# Patient Record
Sex: MALE | Race: WHITE | Employment: FULL TIME | URBAN - METROPOLITAN AREA
[De-identification: names, ages, dates, MRNs, and addresses within clinical notes are randomized per-mention and may not be internally consistent; named-entity substitution may affect disease eponyms.]

---

## 2019-10-21 PROBLEM — E66.01 SEVERE OBESITY (HCC): Status: ACTIVE | Noted: 2019-10-21

## 2019-10-27 ENCOUNTER — HOSPITAL ENCOUNTER (OUTPATIENT)
Dept: LAB | Age: 49
Discharge: HOME | End: 2019-10-27
Payer: COMMERCIAL

## 2019-10-27 LAB
ALBUMIN SERPL-MCNC: 3.9 G/DL (ref 3.5–5)
ALBUMIN/GLOB SERPL: 1 {RATIO} (ref 1.2–3.5)
ALP SERPL-CCNC: 78 U/L (ref 50–136)
ALT SERPL-CCNC: 43 U/L (ref 12–65)
ANION GAP SERPL CALC-SCNC: 8 MMOL/L (ref 7–16)
AST SERPL-CCNC: 20 U/L (ref 15–37)
BILIRUB SERPL-MCNC: 0.4 MG/DL (ref 0.2–1.1)
BUN SERPL-MCNC: 11 MG/DL (ref 6–23)
CALCIUM SERPL-MCNC: 9.8 MG/DL (ref 8.3–10.4)
CHLORIDE SERPL-SCNC: 106 MMOL/L (ref 98–107)
CHOLEST SERPL-MCNC: 267 MG/DL
CO2 SERPL-SCNC: 29 MMOL/L (ref 21–32)
CREAT SERPL-MCNC: 0.82 MG/DL (ref 0.8–1.5)
ERYTHROCYTE [DISTWIDTH] IN BLOOD BY AUTOMATED COUNT: 12.6 % (ref 11.9–14.6)
GLOBULIN SER CALC-MCNC: 4.1 G/DL (ref 2.3–3.5)
GLUCOSE SERPL-MCNC: 95 MG/DL (ref 65–100)
HCT VFR BLD AUTO: 48.8 % (ref 41.1–50.3)
HDLC SERPL-MCNC: 52 MG/DL (ref 40–60)
HDLC SERPL: 5.1 {RATIO}
HGB BLD-MCNC: 16 G/DL (ref 13.6–17.2)
LDLC SERPL CALC-MCNC: 185.8 MG/DL
LIPID PROFILE,FLP: ABNORMAL
MCH RBC QN AUTO: 31.6 PG (ref 26.1–32.9)
MCHC RBC AUTO-ENTMCNC: 32.8 G/DL (ref 31.4–35)
MCV RBC AUTO: 96.4 FL (ref 79.6–97.8)
NRBC # BLD: 0 K/UL (ref 0–0.2)
PLATELET # BLD AUTO: 295 K/UL (ref 150–450)
PMV BLD AUTO: 9.7 FL (ref 9.4–12.3)
POTASSIUM SERPL-SCNC: 4.4 MMOL/L (ref 3.5–5.1)
PROT SERPL-MCNC: 8 G/DL (ref 6.3–8.2)
PSA SERPL-MCNC: 0.4 NG/ML
RBC # BLD AUTO: 5.06 M/UL (ref 4.23–5.6)
SODIUM SERPL-SCNC: 143 MMOL/L (ref 136–145)
TRIGL SERPL-MCNC: 146 MG/DL (ref 35–150)
VLDLC SERPL CALC-MCNC: 29.2 MG/DL (ref 6–23)
WBC # BLD AUTO: 8.3 K/UL (ref 4.3–11.1)

## 2019-10-27 PROCEDURE — 80061 LIPID PANEL: CPT

## 2019-10-27 PROCEDURE — 84153 ASSAY OF PSA TOTAL: CPT

## 2019-10-27 PROCEDURE — 36415 COLL VENOUS BLD VENIPUNCTURE: CPT

## 2019-10-27 PROCEDURE — 80053 COMPREHEN METABOLIC PANEL: CPT

## 2019-10-27 PROCEDURE — 85027 COMPLETE CBC AUTOMATED: CPT

## 2019-10-30 PROBLEM — E78.5 HYPERLIPIDEMIA: Status: ACTIVE | Noted: 2019-10-30

## 2019-12-17 ENCOUNTER — HOSPITAL ENCOUNTER (OUTPATIENT)
Dept: GENERAL RADIOLOGY | Age: 49
Discharge: HOME | End: 2019-12-17
Payer: COMMERCIAL

## 2019-12-17 DIAGNOSIS — G89.29 CHRONIC MIDLINE LOW BACK PAIN WITHOUT SCIATICA: ICD-10-CM

## 2019-12-17 DIAGNOSIS — M54.50 CHRONIC MIDLINE LOW BACK PAIN WITHOUT SCIATICA: ICD-10-CM

## 2019-12-17 PROCEDURE — 72110 X-RAY EXAM L-2 SPINE 4/>VWS: CPT

## 2021-03-29 ENCOUNTER — HOSPITAL ENCOUNTER (EMERGENCY)
Age: 51
Discharge: HOME | End: 2021-03-29
Attending: EMERGENCY MEDICINE
Payer: COMMERCIAL

## 2021-03-29 VITALS
OXYGEN SATURATION: 98 % | SYSTOLIC BLOOD PRESSURE: 135 MMHG | RESPIRATION RATE: 16 BRPM | DIASTOLIC BLOOD PRESSURE: 67 MMHG | HEART RATE: 71 BPM | BODY MASS INDEX: 34.99 KG/M2 | TEMPERATURE: 97.8 F | HEIGHT: 65 IN | WEIGHT: 210 LBS

## 2021-03-29 DIAGNOSIS — M54.50 ACUTE RIGHT-SIDED LOW BACK PAIN WITHOUT SCIATICA: Primary | ICD-10-CM

## 2021-03-29 PROCEDURE — 74011250637 HC RX REV CODE- 250/637: Performed by: PHYSICIAN ASSISTANT

## 2021-03-29 PROCEDURE — 99283 EMERGENCY DEPT VISIT LOW MDM: CPT

## 2021-03-29 RX ORDER — CYCLOBENZAPRINE HCL 10 MG
10 TABLET ORAL
Qty: 10 TAB | Refills: 0 | Status: SHIPPED | OUTPATIENT
Start: 2021-03-29 | End: 2021-03-29 | Stop reason: SDUPTHER

## 2021-03-29 RX ORDER — CYCLOBENZAPRINE HCL 10 MG
10 TABLET ORAL
Status: COMPLETED | OUTPATIENT
Start: 2021-03-29 | End: 2021-03-29

## 2021-03-29 RX ORDER — CYCLOBENZAPRINE HCL 10 MG
10 TABLET ORAL
Qty: 10 TAB | Refills: 0 | Status: SHIPPED | OUTPATIENT
Start: 2021-03-29 | End: 2021-04-08

## 2021-03-29 RX ADMIN — CYCLOBENZAPRINE HYDROCHLORIDE 10 MG: 10 TABLET, FILM COATED ORAL at 10:08

## 2021-03-29 NOTE — Clinical Note
[address] [address] EMERGENCY DEPT 
[address] [facility] [id number] [phone] Work/School Note Date: 3/29/2021 To Whom It May concern: Roby Kline was seen and treated today in the emergency room by the following provider(s): 
Attending Provider: Laura St MD 
Physician Assistant: Jarred Yao. Roby Kline is excused from work/school on 03/29/21 and 03/30/21. He is medically clear to return to work/school on 3/31/2021.   
 
 
Sincerely, 
 
 
 
 
AKBAR Mcintosh

## 2021-03-29 NOTE — ED NOTES
I have reviewed discharge instructions with the patient and caregiver. The patient and caregiver verbalized understanding. Patient left ED via Discharge Method: ambulatory to Home with daughter. Opportunity for questions and clarification provided. Patient given 1 scripts. Flexeril. Work excuse given. Stretch. To continue your aftercare when you leave the hospital, you may receive an automated call from our care team to check in on how you are doing. This is a free service and part of our promise to provide the best care and service to meet your aftercare needs.  If you have questions, or wish to unsubscribe from this service please call [phone]. Thank you for Choosing our [facility] Emergency Department.

## 2021-03-29 NOTE — ED PROVIDER NOTES
Patient is a 67-year-old male who presents to the emergency room with chief complaint of right-sided lumbar back pain that started Friday. States he was sweeping when pain began. Pain progressively worsened over the weekend. Patient denies any specific trauma or falls. Was using Tylenol over the weekend with some minimal improvement of pain. Denies bladder/bowel incontinence, saddle anesthesia, fever, unintentional weight, loss night sweats. Past Medical History:   Diagnosis Date    Obesity (BMI 30-39. 9)     BMI 34.2    Psychiatric disorder     depression- no meds       Past Surgical History:   Procedure Laterality Date    HX ORTHOPAEDIC Right 2013    Knee         Family History:   Problem Relation Age of Onset    Arthritis-osteo Mother     Other Mother         Hypothyroidism    Prostate Cancer Father        Social History     Socioeconomic History    Marital status: SINGLE     Spouse name: Not on file    Number of children: Not on file    Years of education: Not on file    Highest education level: Not on file   Occupational History    Not on file   Social Needs    Financial resource strain: Not on file    Food insecurity     Worry: Not on file     Inability: Not on file    Transportation needs     Medical: Not on file     Non-medical: Not on file   Tobacco Use    Smoking status: Never Smoker    Smokeless tobacco: Never Used   Substance and Sexual Activity    Alcohol use: Yes     Comment: ocassional alcohol use    Drug use: No    Sexual activity: Not on file   Lifestyle    Physical activity     Days per week: Not on file     Minutes per session: Not on file    Stress: Not on file   Relationships    Social connections     Talks on phone: Not on file     Gets together: Not on file     Attends Roman Catholic service: Not on file     Active member of club or organization: Not on file     Attends meetings of clubs or organizations: Not on file     Relationship status: Not on file   Hyun Dillon Intimate partner violence     Fear of current or ex partner: Not on file     Emotionally abused: Not on file     Physically abused: Not on file     Forced sexual activity: Not on file   Other Topics Concern    Not on file   Social History Narrative    Not on file         ALLERGIES: Patient has no known allergies. Review of Systems   Constitutional: Negative for chills and fever. HENT: Negative for facial swelling. Respiratory: Negative for chest tightness and shortness of breath. Cardiovascular: Negative for chest pain. Gastrointestinal: Negative for abdominal pain, nausea and vomiting. Musculoskeletal: Positive for back pain. Negative for arthralgias, gait problem, joint swelling, myalgias, neck pain and neck stiffness. Neurological: Negative for headaches. Psychiatric/Behavioral: Negative for confusion. All other systems reviewed and are negative. Vitals:    03/29/21 0930   BP: 135/67   Pulse: 71   Resp: 16   Temp: 97.8 °F (36.6 °C)   SpO2: 98%   Weight: 95.3 kg (210 lb)   Height: 5' 5\" (1.651 m)            Physical Exam  Vitals signs and nursing note reviewed. Constitutional:       Appearance: Normal appearance. HENT:      Head: Normocephalic and atraumatic. Mouth/Throat:      Mouth: Mucous membranes are moist.   Eyes:      Pupils: Pupils are equal, round, and reactive to light. Cardiovascular:      Rate and Rhythm: Normal rate and regular rhythm. Pulmonary:      Effort: No respiratory distress. Breath sounds: Normal breath sounds. Abdominal:      Palpations: Abdomen is soft. Tenderness: There is no abdominal tenderness. There is no guarding. Musculoskeletal:      Lumbar back: He exhibits spasm. Comments: Right sided paraspinal tenderness, lumbar muscle spasm. Skin:     General: Skin is warm and dry. Neurological:      Mental Status: He is alert and oriented to person, place, and time. Mental status is at baseline.    Psychiatric:         Mood and Affect: Mood normal.          MDM  Number of Diagnoses or Management Options  Acute right-sided low back pain without sciatica  Diagnosis management comments: Patient is a 43-year-old male who presented to emergency room with chief complaint of nontraumatic back pain. Pain began on Friday, progressively worsened as the weekend progressed. Patient denies any trauma or saddle anesthesia or bladder/bowel incontinence. On exam he has a tender lumbar paraspinal muscle spasm. Will treat with Flexeril in the ED, instructions for no lifting bending or twisting, 2 days off of work. Also sent home with an Rx for Flexeril. Patient verbalizes understanding of instructions and Aftercare. He understands importance of following up with primary care, understands indications for which to return to the emergency room such as development of bladder/bowel incontinence, saddle anesthesia, difficulty ambulating, numbness or weakness of lower extremities. Time of discharge patient is alert awake, in no acute distress, afebrile.     Risk of Complications, Morbidity, and/or Mortality  Presenting problems: low  Diagnostic procedures: low  Management options: low    Patient Progress  Patient progress: improved         Procedures

## 2021-03-29 NOTE — LETTER
[address] [address] EMERGENCY DEPT 
[address] [facility] 
[place]et[place] [id number] [phone] Work/School Note Date: 3/29/2021 To Whom It May concern: 
 
 
 
Pastora Hannon needs to be excused from class today on 3/29/21. She was in the ED with her father.  
 
Sincerely, 
 
 
 
 
Helena Angulo RN

## 2021-03-29 NOTE — ED TRIAGE NOTES
Pt presents to ED c/o lower back pain that started a few days ago after bending over and standing back up at work. Denies current numbness/tingling to extremities. No loss of bowel or bladder. Able to ambulate but slowly. Took Tylenol this morning.

## 2021-08-19 PROBLEM — Z00.00 PHYSICAL EXAM: Status: ACTIVE | Noted: 2021-08-19

## 2021-08-19 PROBLEM — Z13.228 SCREENING FOR METABOLIC DISORDER: Status: ACTIVE | Noted: 2021-08-19

## 2021-08-19 PROBLEM — Z23 ENCOUNTER FOR IMMUNIZATION: Status: ACTIVE | Noted: 2021-08-19

## 2021-08-19 PROBLEM — Z12.5 SCREENING FOR PROSTATE CANCER: Status: ACTIVE | Noted: 2021-08-19

## 2021-08-19 PROBLEM — Z12.11 SCREENING FOR COLON CANCER: Status: ACTIVE | Noted: 2021-08-19

## 2021-08-19 PROBLEM — Z11.59 NEED FOR HEPATITIS C SCREENING TEST: Status: ACTIVE | Noted: 2021-08-19

## 2021-08-25 ENCOUNTER — APPOINTMENT (OUTPATIENT)
Dept: GENERAL RADIOLOGY | Age: 51
End: 2021-08-25
Attending: EMERGENCY MEDICINE
Payer: COMMERCIAL

## 2021-08-25 ENCOUNTER — HOSPITAL ENCOUNTER (EMERGENCY)
Age: 51
Discharge: HOME | End: 2021-08-25
Attending: EMERGENCY MEDICINE
Payer: COMMERCIAL

## 2021-08-25 VITALS
BODY MASS INDEX: 35.82 KG/M2 | WEIGHT: 215 LBS | TEMPERATURE: 98.1 F | HEART RATE: 68 BPM | SYSTOLIC BLOOD PRESSURE: 127 MMHG | RESPIRATION RATE: 13 BRPM | HEIGHT: 65 IN | DIASTOLIC BLOOD PRESSURE: 68 MMHG | OXYGEN SATURATION: 98 %

## 2021-08-25 DIAGNOSIS — R07.81 PLEURITIC CHEST PAIN: Primary | ICD-10-CM

## 2021-08-25 LAB
ALBUMIN SERPL-MCNC: 3.5 G/DL (ref 3.5–5)
ALBUMIN/GLOB SERPL: 0.9 {RATIO} (ref 1.2–3.5)
ALP SERPL-CCNC: 86 U/L (ref 50–136)
ALT SERPL-CCNC: 39 U/L (ref 12–65)
ANION GAP SERPL CALC-SCNC: 5 MMOL/L (ref 7–16)
AST SERPL-CCNC: 16 U/L (ref 15–37)
ATRIAL RATE: 75 BPM
BASOPHILS # BLD: 0.1 K/UL (ref 0–0.2)
BASOPHILS NFR BLD: 1 % (ref 0–2)
BILIRUB SERPL-MCNC: 0.3 MG/DL (ref 0.2–1.1)
BUN SERPL-MCNC: 17 MG/DL (ref 6–23)
CALCIUM SERPL-MCNC: 9 MG/DL (ref 8.3–10.4)
CALCULATED P AXIS, ECG09: 52 DEGREES
CALCULATED R AXIS, ECG10: 71 DEGREES
CALCULATED T AXIS, ECG11: 75 DEGREES
CHLORIDE SERPL-SCNC: 110 MMOL/L (ref 98–107)
CO2 SERPL-SCNC: 26 MMOL/L (ref 21–32)
CREAT SERPL-MCNC: 0.87 MG/DL (ref 0.8–1.5)
DIAGNOSIS, 93000: NORMAL
DIFFERENTIAL METHOD BLD: NORMAL
EOSINOPHIL # BLD: 0.4 K/UL (ref 0–0.8)
EOSINOPHIL NFR BLD: 4 % (ref 0.5–7.8)
ERYTHROCYTE [DISTWIDTH] IN BLOOD BY AUTOMATED COUNT: 12.8 % (ref 11.9–14.6)
GLOBULIN SER CALC-MCNC: 3.9 G/DL (ref 2.3–3.5)
GLUCOSE SERPL-MCNC: 108 MG/DL (ref 65–100)
HCT VFR BLD AUTO: 47.3 % (ref 41.1–50.3)
HGB BLD-MCNC: 15.8 G/DL (ref 13.6–17.2)
IMM GRANULOCYTES # BLD AUTO: 0.1 K/UL (ref 0–0.5)
IMM GRANULOCYTES NFR BLD AUTO: 1 % (ref 0–5)
LIPASE SERPL-CCNC: 277 U/L (ref 73–393)
LYMPHOCYTES # BLD: 2.6 K/UL (ref 0.5–4.6)
LYMPHOCYTES NFR BLD: 26 % (ref 13–44)
MAGNESIUM SERPL-MCNC: 1.9 MG/DL (ref 1.8–2.4)
MCH RBC QN AUTO: 32 PG (ref 26.1–32.9)
MCHC RBC AUTO-ENTMCNC: 33.4 G/DL (ref 31.4–35)
MCV RBC AUTO: 95.7 FL (ref 79.6–97.8)
MONOCYTES # BLD: 0.8 K/UL (ref 0.1–1.3)
MONOCYTES NFR BLD: 9 % (ref 4–12)
NEUTS SEG # BLD: 5.8 K/UL (ref 1.7–8.2)
NEUTS SEG NFR BLD: 59 % (ref 43–78)
NRBC # BLD: 0 K/UL (ref 0–0.2)
P-R INTERVAL, ECG05: 146 MS
PLATELET # BLD AUTO: 275 K/UL (ref 150–450)
PMV BLD AUTO: 9.6 FL (ref 9.4–12.3)
POTASSIUM SERPL-SCNC: 3.7 MMOL/L (ref 3.5–5.1)
PROT SERPL-MCNC: 7.4 G/DL (ref 6.3–8.2)
Q-T INTERVAL, ECG07: 370 MS
QRS DURATION, ECG06: 82 MS
QTC CALCULATION (BEZET), ECG08: 413 MS
RBC # BLD AUTO: 4.94 M/UL (ref 4.23–5.6)
SODIUM SERPL-SCNC: 141 MMOL/L (ref 136–145)
TROPONIN-HIGH SENSITIVITY: 9 PG/ML (ref 0–14)
TROPONIN-HIGH SENSITIVITY: 9.4 PG/ML (ref 0–14)
VENTRICULAR RATE, ECG03: 75 BPM
WBC # BLD AUTO: 9.8 K/UL (ref 4.3–11.1)

## 2021-08-25 PROCEDURE — 84484 ASSAY OF TROPONIN QUANT: CPT

## 2021-08-25 PROCEDURE — 85025 COMPLETE CBC W/AUTO DIFF WBC: CPT

## 2021-08-25 PROCEDURE — 83735 ASSAY OF MAGNESIUM: CPT

## 2021-08-25 PROCEDURE — 80053 COMPREHEN METABOLIC PANEL: CPT

## 2021-08-25 PROCEDURE — 71045 X-RAY EXAM CHEST 1 VIEW: CPT

## 2021-08-25 PROCEDURE — 74011250637 HC RX REV CODE- 250/637: Performed by: EMERGENCY MEDICINE

## 2021-08-25 PROCEDURE — 74011250636 HC RX REV CODE- 250/636: Performed by: EMERGENCY MEDICINE

## 2021-08-25 PROCEDURE — 83690 ASSAY OF LIPASE: CPT

## 2021-08-25 PROCEDURE — 96374 THER/PROPH/DIAG INJ IV PUSH: CPT

## 2021-08-25 PROCEDURE — 99284 EMERGENCY DEPT VISIT MOD MDM: CPT

## 2021-08-25 PROCEDURE — 93005 ELECTROCARDIOGRAM TRACING: CPT | Performed by: EMERGENCY MEDICINE

## 2021-08-25 RX ORDER — NAPROXEN 500 MG/1
500 TABLET ORAL 2 TIMES DAILY WITH MEALS
Qty: 20 TABLET | Refills: 0 | Status: SHIPPED | OUTPATIENT
Start: 2021-08-25

## 2021-08-25 RX ORDER — GUAIFENESIN 100 MG/5ML
324 LIQUID (ML) ORAL
Status: COMPLETED | OUTPATIENT
Start: 2021-08-25 | End: 2021-08-25

## 2021-08-25 RX ORDER — SODIUM CHLORIDE 0.9 % (FLUSH) 0.9 %
5-40 SYRINGE (ML) INJECTION AS NEEDED
Status: DISCONTINUED | OUTPATIENT
Start: 2021-08-25 | End: 2021-08-25 | Stop reason: HOSPADM

## 2021-08-25 RX ORDER — SODIUM CHLORIDE 0.9 % (FLUSH) 0.9 %
5-10 SYRINGE (ML) INJECTION EVERY 8 HOURS
Status: DISCONTINUED | OUTPATIENT
Start: 2021-08-25 | End: 2021-08-25 | Stop reason: HOSPADM

## 2021-08-25 RX ORDER — SODIUM CHLORIDE 0.9 % (FLUSH) 0.9 %
5-40 SYRINGE (ML) INJECTION EVERY 8 HOURS
Status: DISCONTINUED | OUTPATIENT
Start: 2021-08-25 | End: 2021-08-25 | Stop reason: HOSPADM

## 2021-08-25 RX ORDER — SODIUM CHLORIDE 0.9 % (FLUSH) 0.9 %
5-10 SYRINGE (ML) INJECTION AS NEEDED
Status: DISCONTINUED | OUTPATIENT
Start: 2021-08-25 | End: 2021-08-25 | Stop reason: HOSPADM

## 2021-08-25 RX ORDER — KETOROLAC TROMETHAMINE 30 MG/ML
15 INJECTION, SOLUTION INTRAMUSCULAR; INTRAVENOUS ONCE
Status: COMPLETED | OUTPATIENT
Start: 2021-08-25 | End: 2021-08-25

## 2021-08-25 RX ADMIN — KETOROLAC TROMETHAMINE 15 MG: 30 INJECTION, SOLUTION INTRAMUSCULAR at 03:45

## 2021-08-25 RX ADMIN — ASPIRIN 324 MG: 81 TABLET, CHEWABLE ORAL at 03:23

## 2021-08-25 NOTE — ED PROVIDER NOTES
The history is provided by the patient and a relative. Chest Pain   This is a new problem. The current episode started 1 to 2 hours ago. The problem has not changed since onset. The problem occurs rarely. The pain is associated with movement. The pain is present in the left side. The pain is moderate. The quality of the pain is described as stabbing and sharp. The pain does not radiate. The symptoms are aggravated by movement. Pertinent negatives include no abdominal pain, no back pain, no cough, no diaphoresis, no dizziness, no exertional chest pressure, no fever, no headaches, no hemoptysis, no irregular heartbeat, no leg pain, no lower extremity edema, no malaise/fatigue, no nausea, no near-syncope, no numbness, no palpitations, no shortness of breath and no vomiting. He has tried nothing for the symptoms. Risk factors include male gender, dyslipidemia and obesity. His past medical history does not include DM or HTN. Pertinent negatives include no cardiac catheterization. Past Medical History:   Diagnosis Date    Obesity (BMI 30-39. 9)     BMI 34.2    Psychiatric disorder     depression- no meds       Past Surgical History:   Procedure Laterality Date    HX ORTHOPAEDIC Right 2013    Knee         Family History:   Problem Relation Age of Onset    Arthritis-osteo Mother     Other Mother         Hypothyroidism    Prostate Cancer Father        Social History     Socioeconomic History    Marital status: SINGLE     Spouse name: Not on file    Number of children: Not on file    Years of education: Not on file    Highest education level: Not on file   Occupational History    Not on file   Tobacco Use    Smoking status: Never Smoker    Smokeless tobacco: Never Used   Substance and Sexual Activity    Alcohol use: Yes     Comment: ocassional alcohol use    Drug use: No    Sexual activity: Not on file   Other Topics Concern    Not on file   Social History Narrative    Not on file     Social Determinants of Health     Financial Resource Strain: Low Risk     Difficulty of Paying Living Expenses: Not very hard   Food Insecurity: Unknown    Worried About Running Out of Food in the Last Year: Never true    Vasiliy of Food in the Last Year: Not on file   Transportation Needs: No Transportation Needs    Lack of Transportation (Medical): No    Lack of Transportation (Non-Medical): No   Physical Activity:     Days of Exercise per Week:     Minutes of Exercise per Session:    Stress:     Feeling of Stress :    Social Connections:     Frequency of Communication with Friends and Family:     Frequency of Social Gatherings with Friends and Family:     Attends Yazidism Services:     Active Member of Clubs or Organizations:     Attends Club or Organization Meetings:     Marital Status:    Intimate Partner Violence:     Fear of Current or Ex-Partner:     Emotionally Abused:     Physically Abused:     Sexually Abused: ALLERGIES: Patient has no known allergies. Review of Systems   Constitutional: Negative for activity change, chills, diaphoresis, fever and malaise/fatigue. HENT: Negative for dental problem, hearing loss, nosebleeds, rhinorrhea and sore throat. Eyes: Negative for pain, discharge, redness and visual disturbance. Respiratory: Negative for cough, hemoptysis, chest tightness and shortness of breath. Cardiovascular: Positive for chest pain. Negative for palpitations, leg swelling and near-syncope. Gastrointestinal: Negative for abdominal pain, constipation, diarrhea, nausea and vomiting. Endocrine: Negative for cold intolerance, heat intolerance, polydipsia and polyuria. Genitourinary: Negative for dysuria and flank pain. Musculoskeletal: Negative for arthralgias, back pain, joint swelling, myalgias and neck pain. Skin: Negative for pallor and rash. Allergic/Immunologic: Negative for environmental allergies and food allergies.    Neurological: Negative for dizziness, tremors, light-headedness, numbness and headaches. Hematological: Negative for adenopathy. Does not bruise/bleed easily. Psychiatric/Behavioral: Negative for confusion and dysphoric mood. The patient is not nervous/anxious and is not hyperactive. All other systems reviewed and are negative. Vitals:    08/25/21 0253   BP: 132/71   Pulse: 72   Resp: 18   SpO2: 98%   Weight: 97.5 kg (215 lb)   Height: 5' 5\" (1.651 m)            Physical Exam  Vitals and nursing note reviewed. Constitutional:       General: He is in acute distress. Appearance: He is well-developed. He is obese. HENT:      Head: Normocephalic and atraumatic. Right Ear: External ear normal.      Left Ear: External ear normal.      Mouth/Throat:      Pharynx: No oropharyngeal exudate. Eyes:      General: No scleral icterus. Conjunctiva/sclera: Conjunctivae normal.      Pupils: Pupils are equal, round, and reactive to light. Neck:      Thyroid: No thyromegaly. Vascular: No JVD. Cardiovascular:      Rate and Rhythm: Normal rate and regular rhythm. Heart sounds: Normal heart sounds. No murmur heard. No friction rub. No gallop. Pulmonary:      Effort: Pulmonary effort is normal. No respiratory distress. Breath sounds: Normal breath sounds. No decreased breath sounds or wheezing. Chest:      Chest wall: Tenderness present. Breasts: Breasts are symmetrical.         Right: No inverted nipple. Left: No inverted nipple. Abdominal:      General: Bowel sounds are normal. There is no distension. Palpations: Abdomen is soft. Tenderness: There is no abdominal tenderness. Musculoskeletal:         General: No tenderness or deformity. Normal range of motion. Cervical back: Normal range of motion and neck supple. Skin:     General: Skin is warm and dry. Capillary Refill: Capillary refill takes less than 2 seconds. Findings: No rash.    Neurological: General: No focal deficit present. Mental Status: He is alert and oriented to person, place, and time. Cranial Nerves: No cranial nerve deficit. Sensory: No sensory deficit. Motor: No abnormal muscle tone. Coordination: Coordination normal.   Psychiatric:         Mood and Affect: Mood normal. Mood is not anxious. Behavior: Behavior normal. Behavior is not agitated. Thought Content: Thought content normal.         Judgment: Judgment normal.          MDM  Number of Diagnoses or Management Options  Pleuritic chest pain: new and requires workup  Diagnosis management comments: 40-year-old male presents with left-sided chest discomfort onset about 2 hours prior to arrival  Left-sided shooting pains  Worse with change in position and other movements  No prior episodes    Initial EKG is unremarkable    3:35 AM  Initial troponin IX  Chest x-ray unremarkable    We will continue to monitor, Toradol for pain  Repeat troponin pending    5:47 AM  Repeat troponin remains negative    Patient will be discharged to follow-up with his primary care doctor    Start NSAID       Amount and/or Complexity of Data Reviewed  Clinical lab tests: ordered and reviewed  Tests in the radiology section of CPT®: ordered and reviewed  Tests in the medicine section of CPT®: ordered and reviewed  Decide to obtain previous medical records or to obtain history from someone other than the patient: yes  Obtain history from someone other than the patient: yes  Review and summarize past medical records: yes  Independent visualization of images, tracings, or specimens: yes    Risk of Complications, Morbidity, and/or Mortality  Presenting problems: moderate  Diagnostic procedures: moderate  Management options: moderate  General comments: Elements of this note have been dictated via voice recognition software. Text and phrases may be limited by the accuracy of the software.   The chart has been reviewed, but errors may still be present.       Patient Progress  Patient progress: stable         EKG    Date/Time: 8/25/2021 2:54 AM  Performed by: Yordy Su MD  Authorized by: Yordy Su MD     ECG reviewed by ED Physician in the absence of a cardiologist: yes    Previous ECG:     Previous ECG:  Unavailable  Interpretation:     Interpretation: normal    Rate:     ECG rate assessment: normal    Rhythm:     Rhythm: sinus rhythm    Ectopy:     Ectopy: none    QRS:     QRS axis:  Normal    QRS intervals:  Normal  Conduction:     Conduction: normal    ST segments:     ST segments:  Normal  T waves:     T waves: normal    Comments:      No acute ischemic changes  No prior tracings available for comparison

## 2021-08-25 NOTE — LETTER
[facility] EMERGENCY DEPT  [address] [address] [phone]    Work/School Note    Date: 8/25/2021    To Whom It May concern:    Grey Milton was seen and treated today in the emergency room by the following provider(s):  No providers found. Grey Milton may return to work on 8/26/21.     Sincerely,

## 2021-08-25 NOTE — ED NOTES
I have reviewed discharge instructions with the patient. The patient verbalized understanding. Patient left ED via Discharge Method: ambulatory to Home with family. Opportunity for questions and clarification provided. Patient given 1 scripts. To continue your aftercare when you leave the hospital, you may receive an automated call from our care team to check in on how you are doing. This is a free service and part of our promise to provide the best care and service to meet your aftercare needs.  If you have questions, or wish to unsubscribe from this service please call [phone]. Thank you for Choosing our [facility] Emergency Department.

## 2021-08-25 NOTE — DISCHARGE INSTRUCTIONS
Take medication as prescribed  No heavy lifting  Drink plenty of fluids  Follow-up with your doctor for recheck  If your symptoms change or worsen in any way please return immediately to the ER for reevaluation

## 2021-09-18 PROBLEM — Z12.5 SCREENING FOR PROSTATE CANCER: Status: RESOLVED | Noted: 2021-08-19 | Resolved: 2021-09-18

## 2021-09-18 PROBLEM — Z23 ENCOUNTER FOR IMMUNIZATION: Status: RESOLVED | Noted: 2021-08-19 | Resolved: 2021-09-18

## 2021-09-18 PROBLEM — Z00.00 PHYSICAL EXAM: Status: RESOLVED | Noted: 2021-08-19 | Resolved: 2021-09-18

## 2021-09-18 PROBLEM — Z13.228 SCREENING FOR METABOLIC DISORDER: Status: RESOLVED | Noted: 2021-08-19 | Resolved: 2021-09-18

## 2021-09-18 PROBLEM — Z12.11 SCREENING FOR COLON CANCER: Status: RESOLVED | Noted: 2021-08-19 | Resolved: 2021-09-18

## 2021-12-08 ENCOUNTER — HOSPITAL ENCOUNTER (OUTPATIENT)
Dept: LAB | Age: 51
Discharge: HOME | End: 2021-12-08

## 2021-12-08 PROCEDURE — 88305 TISSUE EXAM BY PATHOLOGIST: CPT

## 2021-12-10 NOTE — ED NOTES
Pt. Woke with left sided cp, states no pain now pain when he lays down Quality 110: Preventive Care And Screening: Influenza Immunization: Influenza immunization was not ordered or administered, reason not given Quality 226: Preventive Care And Screening: Tobacco Use: Screening And Cessation Intervention: Patient screened for tobacco use and is an ex/non-smoker Detail Level: Detailed Quality 130: Documentation Of Current Medications In The Medical Record: Current Medications Documented Quality 431: Preventive Care And Screening: Unhealthy Alcohol Use - Screening: Patient not identified as an unhealthy alcohol user when screened for unhealthy alcohol use using a systematic screening method

## 2021-12-19 ENCOUNTER — HOSPITAL ENCOUNTER (EMERGENCY)
Age: 51
Discharge: HOME | End: 2021-12-19
Attending: EMERGENCY MEDICINE
Payer: COMMERCIAL

## 2021-12-19 VITALS
BODY MASS INDEX: 36.32 KG/M2 | WEIGHT: 218 LBS | OXYGEN SATURATION: 95 % | HEART RATE: 79 BPM | RESPIRATION RATE: 18 BRPM | HEIGHT: 65 IN | SYSTOLIC BLOOD PRESSURE: 132 MMHG | TEMPERATURE: 97.7 F | DIASTOLIC BLOOD PRESSURE: 70 MMHG

## 2021-12-19 DIAGNOSIS — L50.9 URTICARIA: Primary | ICD-10-CM

## 2021-12-19 PROCEDURE — 96374 THER/PROPH/DIAG INJ IV PUSH: CPT

## 2021-12-19 PROCEDURE — 74011250636 HC RX REV CODE- 250/636

## 2021-12-19 PROCEDURE — 74011250637 HC RX REV CODE- 250/637

## 2021-12-19 PROCEDURE — 96375 TX/PRO/DX INJ NEW DRUG ADDON: CPT

## 2021-12-19 PROCEDURE — 99283 EMERGENCY DEPT VISIT LOW MDM: CPT

## 2021-12-19 RX ORDER — SODIUM CHLORIDE 0.9 % (FLUSH) 0.9 %
5-40 SYRINGE (ML) INJECTION AS NEEDED
Status: DISCONTINUED | OUTPATIENT
Start: 2021-12-19 | End: 2021-12-20 | Stop reason: HOSPADM

## 2021-12-19 RX ORDER — FAMOTIDINE 20 MG/1
20 TABLET, FILM COATED ORAL
Status: COMPLETED | OUTPATIENT
Start: 2021-12-19 | End: 2021-12-19

## 2021-12-19 RX ORDER — DIPHENHYDRAMINE HYDROCHLORIDE 50 MG/ML
12.5 INJECTION, SOLUTION INTRAMUSCULAR; INTRAVENOUS
Status: COMPLETED | OUTPATIENT
Start: 2021-12-19 | End: 2021-12-19

## 2021-12-19 RX ORDER — SODIUM CHLORIDE 0.9 % (FLUSH) 0.9 %
5-40 SYRINGE (ML) INJECTION EVERY 8 HOURS
Status: DISCONTINUED | OUTPATIENT
Start: 2021-12-19 | End: 2021-12-20 | Stop reason: HOSPADM

## 2021-12-19 RX ADMIN — DIPHENHYDRAMINE HYDROCHLORIDE 12.5 MG: 50 INJECTION INTRAMUSCULAR; INTRAVENOUS at 19:16

## 2021-12-19 RX ADMIN — FAMOTIDINE 20 MG: 20 TABLET, FILM COATED ORAL at 18:56

## 2021-12-19 RX ADMIN — METHYLPREDNISOLONE SODIUM SUCCINATE 40 MG: 125 INJECTION, POWDER, FOR SOLUTION INTRAMUSCULAR; INTRAVENOUS at 19:15

## 2021-12-19 NOTE — ED TRIAGE NOTES
Pt states he woke up with a rash this morning and it is now hives with itching all over body. Denies any new foods, soap, detergents. No known allergy. Masked for triage.

## 2021-12-19 NOTE — ED NOTES
PT states woke up this morning covered in hives. No known allergies. No shortness of breath, no difficulty swallowing. No new foods, detergents, soaps. Pt has not taken any medications since hives started. PT states he had a colonoscopy two weeks ago and was told not to take any medications. NAD. Breath sounds normal, no pharyngeal edema. Patient received a rapid medical exam by a provider Francesca Rock PA-C at 1349.

## 2021-12-20 NOTE — ED NOTES
I have reviewed discharge instructions with the patient. The patient verbalized understanding. Patient left ED via Discharge Method: ambulatory to Home with daughter. Opportunity for questions and clarification provided. Patient given 0 scripts. To continue your aftercare when you leave the hospital, you may receive an automated call from our care team to check in on how you are doing. This is a free service and part of our promise to provide the best care and service to meet your aftercare needs.  If you have questions, or wish to unsubscribe from this service please call [phone]. Thank you for Choosing our [facility] Emergency Department.

## 2021-12-20 NOTE — DISCHARGE INSTRUCTIONS
I suspect that you had an allergic reaction to the carpet glue in your brand-new home. Do not sleep on the carpet again tonight. If you wake up in the morning and the itching and rash has returned, you can take 2 over-the-counter Benadryl. No symptomatic relief, contact your primary care provider. Please return to the emergency department if you have difficulty breathing, swelling of your throat, tongue, face, worsening of the symptoms that you had today.

## 2021-12-20 NOTE — ED PROVIDER NOTES
Mr. Maude Gray is a 48-year-old man with history of hyperlipidemia and no known allergies presents to the emergency department with chief complaint of \"hives all over his body that are itchy. \"  Patient states he just moved into a brand-new home was just built neck last night he slept on the carpet with his dog. Patient denies shortness of breath, swelling of throat, chest pain, chest tightness, nausea, vomiting, diarrhea, or pain. Patient endorses diffuse itchiness. Patient denies any new detergents, soap, clothes, or change in environment other than his new home. The history is provided by the patient. No  was used. Past Medical History:   Diagnosis Date    Obesity (BMI 30-39. 9)     BMI 34.2    Psychiatric disorder     depression- no meds       Past Surgical History:   Procedure Laterality Date    HX ORTHOPAEDIC Right 2013    Knee         Family History:   Problem Relation Age of Onset    OSTEOARTHRITIS Mother     Other Mother         Hypothyroidism    Prostate Cancer Father        Social History     Socioeconomic History    Marital status: SINGLE     Spouse name: Not on file    Number of children: Not on file    Years of education: Not on file    Highest education level: Not on file   Occupational History    Not on file   Tobacco Use    Smoking status: Never Smoker    Smokeless tobacco: Never Used   Substance and Sexual Activity    Alcohol use: Yes     Comment: ocassional alcohol use    Drug use: No    Sexual activity: Not on file   Other Topics Concern    Not on file   Social History Narrative    Not on file     Social Determinants of Health     Financial Resource Strain: Low Risk     Difficulty of Paying Living Expenses: Not very hard   Food Insecurity: Unknown    Worried About Running Out of Food in the Last Year: Never true    Vasiliy of Food in the Last Year: Not on file   Transportation Needs: No Transportation Needs    Lack of Transportation (Medical):  No  Lack of Transportation (Non-Medical): No   Physical Activity:     Days of Exercise per Week: Not on file    Minutes of Exercise per Session: Not on file   Stress:     Feeling of Stress : Not on file   Social Connections:     Frequency of Communication with Friends and Family: Not on file    Frequency of Social Gatherings with Friends and Family: Not on file    Attends [employer] Services: Not on file    Active Member of [address] [place] [address] or Organizations: Not on file    Attends Club or Organization Meetings: Not on file    Marital Status: Not on file   Intimate Partner Violence:     Fear of Current or Ex-Partner: Not on file    Emotionally Abused: Not on file    Physically Abused: Not on file    Sexually Abused: Not on file   Housing Stability:     Unable to Pay for Housing in the Last Year: Not on file    Number of [place] in the Last Year: Not on file    Unstable Housing in the Last Year: Not on file         ALLERGIES: Patient has no known allergies. Review of Systems   Constitutional: Negative for chills, diaphoresis and fatigue. HENT: Negative for drooling, facial swelling and sore throat. Eyes: Negative for redness. Respiratory: Negative for cough, shortness of breath, wheezing and stridor. Cardiovascular: Negative for chest pain and palpitations. Gastrointestinal: Negative for abdominal pain and nausea. Skin: Positive for rash. Neurological: Negative for light-headedness. All other systems reviewed and are negative. Vitals:    12/19/21 1847   BP: 127/77   Pulse: 85   Resp: 20   Temp: 97.6 °F (36.4 °C)   SpO2: 96%   Weight: 98.9 kg (218 lb)   Height: 5' 5\" (1.651 m)            Physical Exam  Vitals and nursing note reviewed. Constitutional:       General: He is not in acute distress. Appearance: Normal appearance. He is not ill-appearing, toxic-appearing or diaphoretic. HENT:      Head: Normocephalic and atraumatic. Jaw: No trismus or swelling.       Salivary Glands: Right salivary gland is not diffusely enlarged or tender. Left salivary gland is not diffusely enlarged or tender. Mouth/Throat:      Lips: Pink. Mouth: Mucous membranes are moist. No oral lesions or angioedema. Dentition: No dental abscesses. Tongue: No lesions. Tongue does not deviate from midline. Palate: No mass and lesions. Pharynx: Oropharynx is clear. Uvula midline. No pharyngeal swelling, oropharyngeal exudate, posterior oropharyngeal erythema or uvula swelling. Tonsils: No tonsillar exudate or tonsillar abscesses. 0 on the right. 0 on the left. Eyes:      General: No scleral icterus. Extraocular Movements: Extraocular movements intact. Conjunctiva/sclera: Conjunctivae normal.   Neck:      Comments: No edema of the neck  Cardiovascular:      Rate and Rhythm: Normal rate. Pulses: Normal pulses. Heart sounds: Normal heart sounds. Pulmonary:      Effort: Pulmonary effort is normal.      Breath sounds: Normal breath sounds. Abdominal:      General: Bowel sounds are normal.      Palpations: Abdomen is soft. Musculoskeletal:         General: No swelling or tenderness. Cervical back: No tenderness. Skin:     General: Skin is warm and dry. Findings: Rash (Diffuse [place] area covering entire body from neck down.) present. Rash is purpuric and urticarial (Diffuse, sparing only face, palms of hands and soles of feet). Rash is not crusting, macular, nodular, papular, pustular, scaling or vesicular. Neurological:      Mental Status: He is alert and oriented to person, place, and time. Sensory: No sensory deficit. Psychiatric:         Mood and Affect: Mood normal.         Behavior: Behavior normal.         Thought Content:  Thought content normal.         Judgment: Judgment normal.          MDM  Number of Diagnoses or Management Options  Urticaria  Diagnosis management comments: 58-year-old male with onset of urticaria starting this morning. Diffuse rash all over body with the exception of face, palms of hands, soles of feet. Vital signs normal, patient stable. No signs of respiratory distress. No angioedema of the tongue. No pharyngeal or oral edema. Patient is handling secretions. IV started. Patient will be administered Benadryl, Solu-Medrol, Pepcid IV. Will monitor patient in the ER. Patient recently moved into a new house that was just built and spent last night sleeping on brand-new carpet with his dog. Likely cause of urticaria adhesive in the new carpet. Do not feel that any labs are warranted at this time. No imaging necessary at this time. Patient has been resting comfortably in the emergency department after administration of medications at [facility]. Pruritus and urticaria are gone. There are some residual light pink patches on his skin diffusely-patient states that they no longer itch. Patient's O2 saturation has been within the normal limit. Oxygenating well. No wheezing on follow-up chest auscultation. Discharge patient home instructed not to sleep on the carpet tonight. Will instruct patient that he can try taking OTC Benadryl for itching returns. Gave patient strict return to the emergency department actions. Patient acknowledged he understands. Patient will be picked up by his son. Alonzo Garciama; 12/19/2021 @9:16 PM Voice dictation software was used during the making of this note. This software is not perfect and grammatical and other typographical errors may be present. This note has not been proofread for errors.  ===================================================================      Risk of Complications, Morbidity, and/or Mortality  Presenting problems: low  Diagnostic procedures: minimal  Management options: low    Patient Progress  Patient progress: improved    ED Course as of 12/19/21 2112   Sun Dec 19, 2021   1947 Patient states that his itching has decreased at this time. He states he is somewhat sleepy from his Benadryl. Patient still denies any difficulty breathing. O2 saturation 97%. [JG]   2040 Patient is resting comfortably. Patient states pruritus is gone. Urticaria is no longer present, there are some residual areas of pink-colored patches. Still breathing well, oxygenating well.  [JG]      ED Course User Index  [JG] Jarred Damon       Procedures

## 2022-03-19 PROBLEM — E78.5 HYPERLIPIDEMIA: Status: ACTIVE | Noted: 2019-10-30

## 2022-03-19 PROBLEM — E66.01 SEVERE OBESITY (HCC): Status: ACTIVE | Noted: 2019-10-21

## 2022-03-20 PROBLEM — Z11.59 NEED FOR HEPATITIS C SCREENING TEST: Status: ACTIVE | Noted: 2021-08-19

## 2022-08-31 ENCOUNTER — OFFICE VISIT (OUTPATIENT)
Dept: PRIMARY CARE CLINIC | Facility: CLINIC | Age: 52
End: 2022-08-31
Payer: COMMERCIAL

## 2022-08-31 VITALS
WEIGHT: 205 LBS | BODY MASS INDEX: 34.16 KG/M2 | HEART RATE: 61 BPM | OXYGEN SATURATION: 96 % | SYSTOLIC BLOOD PRESSURE: 116 MMHG | HEIGHT: 65 IN | TEMPERATURE: 97.5 F | DIASTOLIC BLOOD PRESSURE: 72 MMHG

## 2022-08-31 DIAGNOSIS — Z71.3 DIETARY COUNSELING: ICD-10-CM

## 2022-08-31 DIAGNOSIS — E78.5 HYPERLIPIDEMIA, UNSPECIFIED HYPERLIPIDEMIA TYPE: ICD-10-CM

## 2022-08-31 DIAGNOSIS — Z71.82 EXERCISE COUNSELING: ICD-10-CM

## 2022-08-31 DIAGNOSIS — Z00.00 PHYSICAL EXAM: Primary | ICD-10-CM

## 2022-08-31 DIAGNOSIS — E66.01 SEVERE OBESITY (HCC): ICD-10-CM

## 2022-08-31 DIAGNOSIS — R73.9 ELEVATED BLOOD SUGAR: ICD-10-CM

## 2022-08-31 DIAGNOSIS — Z12.5 SCREENING FOR PROSTATE CANCER: ICD-10-CM

## 2022-08-31 PROCEDURE — 99396 PREV VISIT EST AGE 40-64: CPT | Performed by: FAMILY MEDICINE

## 2022-08-31 SDOH — ECONOMIC STABILITY: TRANSPORTATION INSECURITY
IN THE PAST 12 MONTHS, HAS LACK OF TRANSPORTATION KEPT YOU FROM MEETINGS, WORK, OR FROM GETTING THINGS NEEDED FOR DAILY LIVING?: NO

## 2022-08-31 SDOH — ECONOMIC STABILITY: FOOD INSECURITY: WITHIN THE PAST 12 MONTHS, YOU WORRIED THAT YOUR FOOD WOULD RUN OUT BEFORE YOU GOT MONEY TO BUY MORE.: NEVER TRUE

## 2022-08-31 SDOH — ECONOMIC STABILITY: TRANSPORTATION INSECURITY
IN THE PAST 12 MONTHS, HAS THE LACK OF TRANSPORTATION KEPT YOU FROM MEDICAL APPOINTMENTS OR FROM GETTING MEDICATIONS?: NO

## 2022-08-31 SDOH — ECONOMIC STABILITY: FOOD INSECURITY: WITHIN THE PAST 12 MONTHS, THE FOOD YOU BOUGHT JUST DIDN'T LAST AND YOU DIDN'T HAVE MONEY TO GET MORE.: NEVER TRUE

## 2022-08-31 ASSESSMENT — PATIENT HEALTH QUESTIONNAIRE - PHQ9
1. LITTLE INTEREST OR PLEASURE IN DOING THINGS: 0
2. FEELING DOWN, DEPRESSED OR HOPELESS: 0
SUM OF ALL RESPONSES TO PHQ9 QUESTIONS 1 & 2: 0
SUM OF ALL RESPONSES TO PHQ QUESTIONS 1-9: 0

## 2022-08-31 ASSESSMENT — LIFESTYLE VARIABLES
HOW MANY STANDARD DRINKS CONTAINING ALCOHOL DO YOU HAVE ON A TYPICAL DAY: 1 OR 2
HOW OFTEN DO YOU HAVE A DRINK CONTAINING ALCOHOL: MONTHLY OR LESS

## 2022-08-31 ASSESSMENT — SOCIAL DETERMINANTS OF HEALTH (SDOH): HOW HARD IS IT FOR YOU TO PAY FOR THE VERY BASICS LIKE FOOD, HOUSING, MEDICAL CARE, AND HEATING?: NOT HARD AT ALL

## 2022-09-01 NOTE — PROGRESS NOTES
[address]  Office : [phone]  Fax : [phone]      Subjective: The patient is a 45 y.o. male  who presents for f/u on PHYSICAL EXAM -no new concerns-pt here to get routeine labs and need refill on meds. no cardiopulmonary symptoms  Hyperlipidemia-diet controlled  Obesity-pts diet poor    Patient Active Problem List   Diagnosis Code    Severe obesity ([address] E66.01    Hyperlipidemia E78.5    Need for hepatitis C screening test Z11.59    Encounter for immunization Z23    Screening for colon cancer Z12.11    Screening for prostate cancer Z12.5    Screening for metabolic disorder G17.988    Physical exam Z00.00       Past Medical History:   Diagnosis Date    Obesity (BMI 30-39. 9)     BMI 34.2    Psychiatric disorder     depression- no meds       Past Surgical History:   Procedure Laterality Date    HX ORTHOPAEDIC Right 2013    Knee       Social History     Socioeconomic History    Marital status: SINGLE     Spouse name: Not on file    Number of children: Not on file    Years of education: Not on file    Highest education level: Not on file   Occupational History    Not on file   Tobacco Use    Smoking status: Never Smoker    Smokeless tobacco: Never Used   Substance and Sexual Activity    Alcohol use: Yes     Comment: ocassional alcohol use    Drug use: No    Sexual activity: Not on file   Other Topics Concern    Not on file   Social History Narrative    Not on file     Social Determinants of Health     Financial Resource Strain: Low Risk     Difficulty of Paying Living Expenses: Not very hard   Food Insecurity: Unknown    Worried About Running Out of Food in the Last Year: Never true    Ran Out of Food in the Last Year: Not on file   Transportation Needs: No Transportation Needs    Lack of Transportation (Medical): No    Lack of Transportation (Non-Medical):  No   Physical Activity:     Days of Exercise per Week:     Minutes of Exercise per Session: Stress:     Feeling of Stress :    Social Connections:     Frequency of Communication with Friends and Family:     Frequency of Social Gatherings with Friends and Family:     Attends [employer] Services: Active Member of Clubs or Organizations:     Attends Club or Organization Meetings:     Marital Status:    Intimate Partner Violence:     Fear of Current or Ex-Partner:     Emotionally Abused:     Physically Abused:     Sexually Abused:        No Known Allergies          Review of Systems   Constitutional: Negative. HENT: Negative. Eyes: Negative. Respiratory: Negative. Cardiovascular: Negative. Gastrointestinal: Negative. Genitourinary: Negative. Musculoskeletal: Negative. Skin: Negative. Neurological: Negative. Endo/Heme/Allergies: Negative. Psychiatric/Behavioral: Negative. Objective:    Visit Vitals  /89 (BP 1 Location: Left upper arm, BP Patient Position: Sitting, BP Cuff Size: Adult)   Pulse 73   Temp 97.2 °F (36.2 °C) (Temporal)   Ht 5' 5\" (1.651 m)   Wt 217 lb (98.4 kg)   SpO2 98%   BMI 36.11 kg/m²       Physical Exam  Constitutional:       Appearance: He is well-developed. He is obese. HENT:      Head: Normocephalic and atraumatic. Right Ear: External ear normal.      Left Ear: External ear normal.      Nose: Nose normal.   Eyes:      Conjunctiva/sclera: Conjunctivae normal.      Pupils: Pupils are equal, round, and reactive to light. Cardiovascular:      Rate and Rhythm: Normal rate and regular rhythm. Heart sounds: Normal heart sounds. Pulmonary:      Effort: Pulmonary effort is normal.      Breath sounds: Normal breath sounds. Abdominal:      General: Bowel sounds are normal.      Palpations: Abdomen is soft. Musculoskeletal:         General: Normal range of motion. Cervical back: Normal range of motion and neck supple. Skin:     General: Skin is warm and dry.    Neurological:      Mental Status: He is alert and oriented to person, place, and time. Deep Tendon Reflexes: Reflexes are normal and symmetric. Psychiatric:         Behavior: Behavior normal.         Judgment: Judgment normal.           . RESULTRCNTNC(15W    ASSESSMENT/PLAN:  1. Physical exam  Overview:  tdap 2021  Labs/PSA  2022  Colonoscopy [id number] recheck in 2024  Orders:  -     Comprehensive Metabolic Panel; Future  -     Lipid Panel; Future  -     PSA Screening; Future  -     Hemoglobin A1C; Future  2. Hyperlipidemia, unspecified hyperlipidemia type  Overview:  DIET CONTROLLED      Orders:  -     Comprehensive Metabolic Panel; Future  -     Lipid Panel; Future  -     PSA Screening; Future  -     Hemoglobin A1C; Future  3. Severe obesity ([facility] [address] [facility]  -     Comprehensive Metabolic Panel; Future  -     Lipid Panel; Future  -     PSA Screening; Future  -     Hemoglobin A1C; Future  4. Dietary counseling  Overview:  Walk 30 min daily  Orders:  -     Comprehensive Metabolic Panel; Future  -     Lipid Panel; Future  -     PSA Screening; Future  -     Hemoglobin A1C; Future  5. Exercise counseling  Overview:  Walk 30 min daily  Orders:  -     Comprehensive Metabolic Panel; Future  -     Lipid Panel; Future  -     PSA Screening; Future  -     Hemoglobin A1C; Future  6. Screening for prostate cancer  Overview:  psa  Orders:  -     Comprehensive Metabolic Panel; Future  -     Lipid Panel; Future  -     PSA Screening; Future  -     Hemoglobin A1C; Future  7. Elevated blood sugar  Overview:  Low carb diet  Check a1c  Orders:  -     Comprehensive Metabolic Panel; Future  -     Lipid Panel; Future  -     PSA Screening;  Future  -     Hemoglobin A1C; Future     Orders Placed This Encounter   Procedures    Comprehensive Metabolic Panel     Standing Status:   Future     Standing Expiration Date:   8/31/2023    Lipid Panel     Standing Status:   Future     Standing Expiration Date:   8/31/2023     Order Specific Question:   Is Patient Fasting?/# of Hours     Answer:   0    PSA

## 2022-09-08 DIAGNOSIS — R73.9 ELEVATED BLOOD SUGAR: ICD-10-CM

## 2022-09-08 DIAGNOSIS — Z71.82 EXERCISE COUNSELING: ICD-10-CM

## 2022-09-08 DIAGNOSIS — Z12.5 SCREENING FOR PROSTATE CANCER: ICD-10-CM

## 2022-09-08 DIAGNOSIS — Z71.3 DIETARY COUNSELING: ICD-10-CM

## 2022-09-08 DIAGNOSIS — E78.5 HYPERLIPIDEMIA, UNSPECIFIED HYPERLIPIDEMIA TYPE: ICD-10-CM

## 2022-09-08 DIAGNOSIS — Z00.00 PHYSICAL EXAM: ICD-10-CM

## 2022-09-08 DIAGNOSIS — E66.01 SEVERE OBESITY (HCC): ICD-10-CM

## 2022-09-08 LAB
EST. AVERAGE GLUCOSE BLD GHB EST-MCNC: 117 MG/DL
HBA1C MFR BLD: 5.7 % (ref 4.8–5.6)

## 2022-09-09 LAB
ALBUMIN SERPL-MCNC: 3.5 G/DL (ref 3.5–5)
ALBUMIN/GLOB SERPL: 1.1 {RATIO} (ref 1.2–3.5)
ALP SERPL-CCNC: 73 U/L (ref 50–136)
ALT SERPL-CCNC: 31 U/L (ref 12–65)
ANION GAP SERPL CALC-SCNC: 6 MMOL/L (ref 4–13)
AST SERPL-CCNC: 14 U/L (ref 15–37)
BILIRUB SERPL-MCNC: 0.2 MG/DL (ref 0.2–1.1)
BUN SERPL-MCNC: 16 MG/DL (ref 6–23)
CALCIUM SERPL-MCNC: 9 MG/DL (ref 8.3–10.4)
CHLORIDE SERPL-SCNC: 113 MMOL/L (ref 101–110)
CHOLEST SERPL-MCNC: 214 MG/DL
CO2 SERPL-SCNC: 25 MMOL/L (ref 21–32)
CREAT SERPL-MCNC: 0.8 MG/DL (ref 0.8–1.5)
GLOBULIN SER CALC-MCNC: 3.1 G/DL (ref 2.3–3.5)
GLUCOSE SERPL-MCNC: 96 MG/DL (ref 65–100)
HDLC SERPL-MCNC: 52 MG/DL (ref 40–60)
HDLC SERPL: 4.1 {RATIO}
LDLC SERPL CALC-MCNC: 148.6 MG/DL
POTASSIUM SERPL-SCNC: 4.3 MMOL/L (ref 3.5–5.1)
PROT SERPL-MCNC: 6.6 G/DL (ref 6.3–8.2)
PSA SERPL-MCNC: 0.4 NG/ML
SODIUM SERPL-SCNC: 144 MMOL/L (ref 136–145)
TRIGL SERPL-MCNC: 67 MG/DL (ref 35–150)
VLDLC SERPL CALC-MCNC: 13.4 MG/DL (ref 6–23)

## 2022-09-12 ENCOUNTER — TELEPHONE (OUTPATIENT)
Dept: PRIMARY CARE CLINIC | Facility: CLINIC | Age: 52
End: 2022-09-12

## 2022-09-12 NOTE — TELEPHONE ENCOUNTER
----- Message from Jaun Ambrosio MD sent at 9/9/2022  7:55 AM EDT -----  Pt has prediabetes-should be on low carb diet-avoid sweets/carbs/sweet drinks + exercise 30 min daily  Pt has high bad cholesterol-should be on low fat diet    All other labs normal

## 2022-09-30 PROBLEM — Z00.00 PHYSICAL EXAM: Status: RESOLVED | Noted: 2022-08-31 | Resolved: 2022-09-30

## 2022-09-30 PROBLEM — Z12.5 SCREENING FOR PROSTATE CANCER: Status: RESOLVED | Noted: 2022-08-31 | Resolved: 2022-09-30

## 2023-02-22 ENCOUNTER — OFFICE VISIT (OUTPATIENT)
Dept: PRIMARY CARE CLINIC | Facility: CLINIC | Age: 53
End: 2023-02-22
Payer: COMMERCIAL

## 2023-02-22 VITALS
SYSTOLIC BLOOD PRESSURE: 111 MMHG | BODY MASS INDEX: 34.49 KG/M2 | OXYGEN SATURATION: 95 % | HEIGHT: 65 IN | HEART RATE: 59 BPM | DIASTOLIC BLOOD PRESSURE: 66 MMHG | WEIGHT: 207 LBS | TEMPERATURE: 97.3 F

## 2023-02-22 DIAGNOSIS — Z71.3 DIETARY COUNSELING: ICD-10-CM

## 2023-02-22 DIAGNOSIS — R73.03 PREDIABETES: ICD-10-CM

## 2023-02-22 DIAGNOSIS — E66.01 SEVERE OBESITY (HCC): ICD-10-CM

## 2023-02-22 DIAGNOSIS — E78.5 HYPERLIPIDEMIA, UNSPECIFIED HYPERLIPIDEMIA TYPE: Primary | ICD-10-CM

## 2023-02-22 DIAGNOSIS — Z71.82 EXERCISE COUNSELING: ICD-10-CM

## 2023-02-22 DIAGNOSIS — Z23 ENCOUNTER FOR IMMUNIZATION: ICD-10-CM

## 2023-02-22 PROCEDURE — 90471 IMMUNIZATION ADMIN: CPT | Performed by: FAMILY MEDICINE

## 2023-02-22 PROCEDURE — 90674 CCIIV4 VAC NO PRSV 0.5 ML IM: CPT | Performed by: FAMILY MEDICINE

## 2023-02-22 PROCEDURE — 99214 OFFICE O/P EST MOD 30 MIN: CPT | Performed by: FAMILY MEDICINE

## 2023-02-22 RX ORDER — CHLORAL HYDRATE 500 MG
1000 CAPSULE ORAL DAILY
Qty: 90 CAPSULE | Refills: 1 | Status: SHIPPED | OUTPATIENT
Start: 2023-02-22

## 2023-02-22 SDOH — ECONOMIC STABILITY: HOUSING INSECURITY
IN THE LAST 12 MONTHS, WAS THERE A TIME WHEN YOU DID NOT HAVE A STEADY PLACE TO SLEEP OR SLEPT IN A SHELTER (INCLUDING NOW)?: NO

## 2023-02-22 SDOH — ECONOMIC STABILITY: INCOME INSECURITY: IN THE LAST 12 MONTHS, WAS THERE A TIME WHEN YOU WERE NOT ABLE TO PAY THE MORTGAGE OR RENT ON TIME?: NO

## 2023-02-22 SDOH — ECONOMIC STABILITY: HOUSING INSECURITY: IN THE LAST 12 MONTHS, HOW MANY PLACES HAVE YOU LIVED?: 1

## 2023-02-22 SDOH — ECONOMIC STABILITY: FOOD INSECURITY: WITHIN THE PAST 12 MONTHS, THE FOOD YOU BOUGHT JUST DIDN'T LAST AND YOU DIDN'T HAVE MONEY TO GET MORE.: NEVER TRUE

## 2023-02-22 SDOH — HEALTH STABILITY: PHYSICAL HEALTH: ON AVERAGE, HOW MANY DAYS PER WEEK DO YOU ENGAGE IN MODERATE TO STRENUOUS EXERCISE (LIKE A BRISK WALK)?: 7 DAYS

## 2023-02-22 SDOH — ECONOMIC STABILITY: INCOME INSECURITY: HOW HARD IS IT FOR YOU TO PAY FOR THE VERY BASICS LIKE FOOD, HOUSING, MEDICAL CARE, AND HEATING?: NOT HARD AT ALL

## 2023-02-22 SDOH — HEALTH STABILITY: PHYSICAL HEALTH: ON AVERAGE, HOW MANY MINUTES DO YOU ENGAGE IN EXERCISE AT THIS LEVEL?: 150+ MIN

## 2023-02-22 SDOH — ECONOMIC STABILITY: FOOD INSECURITY: WITHIN THE PAST 12 MONTHS, YOU WORRIED THAT YOUR FOOD WOULD RUN OUT BEFORE YOU GOT MONEY TO BUY MORE.: NEVER TRUE

## 2023-02-22 ASSESSMENT — PATIENT HEALTH QUESTIONNAIRE - PHQ9
2. FEELING DOWN, DEPRESSED OR HOPELESS: 0
SUM OF ALL RESPONSES TO PHQ9 QUESTIONS 1 & 2: 0
SUM OF ALL RESPONSES TO PHQ QUESTIONS 1-9: 0
1. LITTLE INTEREST OR PLEASURE IN DOING THINGS: 0
SUM OF ALL RESPONSES TO PHQ QUESTIONS 1-9: 0

## 2023-02-22 ASSESSMENT — SOCIAL DETERMINANTS OF HEALTH (SDOH)
WITHIN THE LAST YEAR, HAVE TO BEEN RAPED OR FORCED TO HAVE ANY KIND OF SEXUAL ACTIVITY BY YOUR PARTNER OR EX-PARTNER?: NO
WITHIN THE LAST YEAR, HAVE YOU BEEN HUMILIATED OR EMOTIONALLY ABUSED IN OTHER WAYS BY YOUR PARTNER OR EX-PARTNER?: NO
HOW OFTEN DO YOU ATTEND CHURCH OR RELIGIOUS SERVICES?: NEVER
HOW OFTEN DO YOU ATTENT MEETINGS OF THE CLUB OR ORGANIZATION YOU BELONG TO?: NEVER
WITHIN THE LAST YEAR, HAVE YOU BEEN KICKED, HIT, SLAPPED, OR OTHERWISE PHYSICALLY HURT BY YOUR PARTNER OR EX-PARTNER?: NO
WITHIN THE LAST YEAR, HAVE YOU BEEN AFRAID OF YOUR PARTNER OR EX-PARTNER?: NO
DO YOU BELONG TO ANY CLUBS OR ORGANIZATIONS SUCH AS CHURCH GROUPS UNIONS, FRATERNAL OR ATHLETIC GROUPS, OR SCHOOL GROUPS?: YES
HOW OFTEN DO YOU GET TOGETHER WITH FRIENDS OR RELATIVES?: MORE THAN THREE TIMES A WEEK
IN A TYPICAL WEEK, HOW MANY TIMES DO YOU TALK ON THE PHONE WITH FAMILY, FRIENDS, OR NEIGHBORS?: MORE THAN THREE TIMES A WEEK

## 2023-02-22 NOTE — PROGRESS NOTES
[address]  Office : [phone]  Fax : [phone]      Subjective: The patient is a 45 y.o. male  who presents for f/u on chronic medical problems-pt here to get routeine labs and need refill on meds. no cardiopulmonary symptoms  Hyperlipidemia-diet controlled  Prediabetes -diet controlled  Obesity-pts diet poor    Patient Active Problem List   Diagnosis Code    Severe obesity ([address] E66.01    Hyperlipidemia E78.5    Need for hepatitis C screening test Z11.59    Encounter for immunization Z23    Screening for colon cancer Z12.11    Screening for prostate cancer Z12.5    Screening for metabolic disorder F03.744    Physical exam Z00.00       Past Medical History:   Diagnosis Date    Obesity (BMI 30-39. 9)     BMI 34.2    Psychiatric disorder     depression- no meds       Past Surgical History:   Procedure Laterality Date    HX ORTHOPAEDIC Right 2013    Knee       Social History     Socioeconomic History    Marital status: SINGLE     Spouse name: Not on file    Number of children: Not on file    Years of education: Not on file    Highest education level: Not on file   Occupational History    Not on file   Tobacco Use    Smoking status: Never Smoker    Smokeless tobacco: Never Used   Substance and Sexual Activity    Alcohol use: Yes     Comment: ocassional alcohol use    Drug use: No    Sexual activity: Not on file   Other Topics Concern    Not on file   Social History Narrative    Not on file     Social Determinants of Health     Financial Resource Strain: Low Risk     Difficulty of Paying Living Expenses: Not very hard   Food Insecurity: Unknown    Worried About Running Out of Food in the Last Year: Never true    Ran Out of Food in the Last Year: Not on file   Transportation Needs: No Transportation Needs    Lack of Transportation (Medical): No    Lack of Transportation (Non-Medical):  No   Physical Activity:     Days of Exercise per Week:     Minutes of Exercise per Session:    Stress:     Feeling of Stress :    Social Connections:     Frequency of Communication with Friends and Family:     Frequency of Social Gatherings with Friends and Family:     Attends Protestant Services: Active Member of Clubs or Organizations:     Attends Club or Organization Meetings:     Marital Status:    Intimate Partner Violence:     Fear of Current or Ex-Partner:     Emotionally Abused:     Physically Abused:     Sexually Abused:        No Known Allergies          Review of Systems   Constitutional: Negative. HENT: Negative. Eyes: Negative. Respiratory: Negative. Cardiovascular: Negative. Gastrointestinal: Negative. Genitourinary: Negative. Musculoskeletal: Negative. Skin: Negative. Neurological: Negative. Endo/Heme/Allergies: Negative. Psychiatric/Behavioral: Negative. Objective:    Visit Vitals  /89 (BP 1 Location: Left upper arm, BP Patient Position: Sitting, BP Cuff Size: Adult)   Pulse 73   Temp 97.2 °F (36.2 °C) (Temporal)   Ht 5' 5\" (1.651 m)   Wt 217 lb (98.4 kg)   SpO2 98%   BMI 36.11 kg/m²       Physical Exam  Constitutional:       Appearance: He is well-developed. He is obese. HENT:      Head: Normocephalic and atraumatic. Right Ear: External ear normal.      Left Ear: External ear normal.      Nose: Nose normal.   Eyes:      Conjunctiva/sclera: Conjunctivae normal.      Pupils: Pupils are equal, round, and reactive to light. Cardiovascular:      Rate and Rhythm: Normal rate and regular rhythm. Heart sounds: Normal heart sounds. Pulmonary:      Effort: Pulmonary effort is normal.      Breath sounds: Normal breath sounds. Abdominal:      General: Bowel sounds are normal.      Palpations: Abdomen is soft. Musculoskeletal:         General: Normal range of motion. Cervical back: Normal range of motion and neck supple. Skin:     General: Skin is warm and dry.    Neurological:      Mental Status: He is alert and oriented to person, place, and time. Deep Tendon Reflexes: Reflexes are normal and symmetric. Psychiatric:         Behavior: Behavior normal.         Judgment: Judgment normal.           . RESULTRCNTNC(15W    ASSESSMENT/PLAN:  1. Hyperlipidemia, unspecified hyperlipidemia type  Overview:  DIET CONTROLLED  Fish oil 1-2 gram daily    Orders:  -     Comprehensive Metabolic Panel; Future  -     Hemoglobin A1C; Future  -     Lipid Panel; Future  2. Prediabetes  Comments:  diet controlled  Overview:  diet controlled    Orders:  -     Comprehensive Metabolic Panel; Future  -     Hemoglobin A1C; Future  -     Lipid Panel; Future  3. Encounter for immunization  -     Influenza, FLUCELVAX, (age 10 mo+), IM, PF, 0.5 mL  -     Comprehensive Metabolic Panel; Future  -     Hemoglobin A1C; Future  -     Lipid Panel; Future  4. Severe obesity ([facility] [address]  -     Comprehensive Metabolic Panel; Future  -     Hemoglobin A1C; Future  -     Lipid Panel; Future  5. Dietary counseling  Comments:  low carb/fat diet  Overview:  low carb/fat diet    Orders:  -     Comprehensive Metabolic Panel; Future  -     Hemoglobin A1C; Future  -     Lipid Panel; Future  6. Exercise counseling  Overview:  Walk 30 min daily  Orders:  -     Comprehensive Metabolic Panel; Future  -     Hemoglobin A1C; Future  -     Lipid Panel;  Future     Orders Placed This Encounter   Procedures    Influenza, FLUCELVAX, (age 10 mo+), IM, PF, 0.5 mL    Comprehensive Metabolic Panel     Standing Status:   Future     Standing Expiration Date:   2/22/2024    Hemoglobin A1C     Standing Status:   Future     Standing Expiration Date:   2/22/2024    Lipid Panel     Standing Status:   Future     Standing Expiration Date:   2/22/2024     Order Specific Question:   Is Patient Fasting?/# of Hours     Answer:   0      Orders Placed This Encounter   Medications    Omega-3 Fatty Acids (FISH OIL) 1000 MG capsule     Sig: Take 1 capsule by mouth daily     Dispense:  90 capsule Refill:  1        We discussed the expected course, resolution and complications of the diagnosis(es) in detail. Medication risks, benefits, costs, interactions, and alternatives were discussed as indicated. I advised her to contact the office if her condition worsens, changes or fails to improve as anticipated. She expressed understanding with the diagnosis(es) and plan. I    have done counseling/anticipatory guidance and  Done risk factor reduction interventions discussion today . Follow-up and Dispositions    Return in about 1 year (around 8/19/2022), or if symptoms worsen or fail to improve. Kimber Hager MD

## 2023-08-07 DIAGNOSIS — Z71.82 EXERCISE COUNSELING: ICD-10-CM

## 2023-08-07 DIAGNOSIS — Z71.3 DIETARY COUNSELING: ICD-10-CM

## 2023-08-07 DIAGNOSIS — E66.01 SEVERE OBESITY (HCC): ICD-10-CM

## 2023-08-07 DIAGNOSIS — R73.03 PREDIABETES: ICD-10-CM

## 2023-08-07 DIAGNOSIS — Z23 ENCOUNTER FOR IMMUNIZATION: ICD-10-CM

## 2023-08-07 DIAGNOSIS — E78.5 HYPERLIPIDEMIA, UNSPECIFIED HYPERLIPIDEMIA TYPE: ICD-10-CM

## 2023-08-07 LAB
ALBUMIN SERPL-MCNC: 3.7 G/DL (ref 3.5–5)
ALBUMIN/GLOB SERPL: 1.1 (ref 0.4–1.6)
ALP SERPL-CCNC: 69 U/L (ref 50–136)
ALT SERPL-CCNC: 35 U/L (ref 12–65)
ANION GAP SERPL CALC-SCNC: 8 MMOL/L (ref 2–11)
AST SERPL-CCNC: 13 U/L (ref 15–37)
BILIRUB SERPL-MCNC: 0.4 MG/DL (ref 0.2–1.1)
BUN SERPL-MCNC: 15 MG/DL (ref 6–23)
CALCIUM SERPL-MCNC: 9.6 MG/DL (ref 8.3–10.4)
CHLORIDE SERPL-SCNC: 111 MMOL/L (ref 101–110)
CHOLEST SERPL-MCNC: 235 MG/DL
CO2 SERPL-SCNC: 25 MMOL/L (ref 21–32)
CREAT SERPL-MCNC: 0.8 MG/DL (ref 0.8–1.5)
GLOBULIN SER CALC-MCNC: 3.4 G/DL (ref 2.8–4.5)
GLUCOSE SERPL-MCNC: 96 MG/DL (ref 65–100)
HDLC SERPL-MCNC: 53 MG/DL (ref 40–60)
HDLC SERPL: 4.4
LDLC SERPL CALC-MCNC: 157 MG/DL
POTASSIUM SERPL-SCNC: 4.1 MMOL/L (ref 3.5–5.1)
PROT SERPL-MCNC: 7.1 G/DL (ref 6.3–8.2)
SODIUM SERPL-SCNC: 144 MMOL/L (ref 133–143)
TRIGL SERPL-MCNC: 125 MG/DL (ref 35–150)
VLDLC SERPL CALC-MCNC: 25 MG/DL (ref 6–23)

## 2023-08-08 LAB
EST. AVERAGE GLUCOSE BLD GHB EST-MCNC: 111 MG/DL
HBA1C MFR BLD: 5.5 % (ref 4.8–5.6)

## 2023-08-22 ENCOUNTER — OFFICE VISIT (OUTPATIENT)
Dept: PRIMARY CARE CLINIC | Facility: CLINIC | Age: 53
End: 2023-08-22
Payer: COMMERCIAL

## 2023-08-22 VITALS
HEIGHT: 65 IN | DIASTOLIC BLOOD PRESSURE: 76 MMHG | OXYGEN SATURATION: 95 % | WEIGHT: 214 LBS | TEMPERATURE: 98.1 F | SYSTOLIC BLOOD PRESSURE: 113 MMHG | HEART RATE: 61 BPM | BODY MASS INDEX: 35.65 KG/M2

## 2023-08-22 DIAGNOSIS — E66.01 SEVERE OBESITY (HCC): ICD-10-CM

## 2023-08-22 DIAGNOSIS — Z00.00 PHYSICAL EXAM: Primary | ICD-10-CM

## 2023-08-22 DIAGNOSIS — E78.5 HYPERLIPIDEMIA, UNSPECIFIED HYPERLIPIDEMIA TYPE: ICD-10-CM

## 2023-08-22 DIAGNOSIS — Z71.82 EXERCISE COUNSELING: ICD-10-CM

## 2023-08-22 DIAGNOSIS — Z71.3 DIETARY COUNSELING: ICD-10-CM

## 2023-08-22 PROCEDURE — 99396 PREV VISIT EST AGE 40-64: CPT | Performed by: FAMILY MEDICINE

## 2023-08-22 RX ORDER — CHLORAL HYDRATE 500 MG
1000 CAPSULE ORAL DAILY
Qty: 90 CAPSULE | Refills: 1 | Status: SHIPPED | OUTPATIENT
Start: 2023-08-22

## 2023-08-22 NOTE — PROGRESS NOTES
expressed understanding with the diagnosis(es) and plan. I    have done counseling/anticipatory guidance and  Done risk factor reduction interventions discussion today . Follow-up and Dispositions    Return in about 1 year  or if symptoms worsen or fail to improve. Kimber Torres MD

## 2023-09-21 PROBLEM — Z00.00 PHYSICAL EXAM: Status: RESOLVED | Noted: 2022-08-31 | Resolved: 2023-09-21

## 2023-10-02 ENCOUNTER — OFFICE VISIT (OUTPATIENT)
Dept: PRIMARY CARE CLINIC | Facility: CLINIC | Age: 53
End: 2023-10-02
Payer: COMMERCIAL

## 2023-10-02 VITALS
BODY MASS INDEX: 35.82 KG/M2 | SYSTOLIC BLOOD PRESSURE: 124 MMHG | DIASTOLIC BLOOD PRESSURE: 60 MMHG | TEMPERATURE: 97.8 F | HEIGHT: 65 IN | OXYGEN SATURATION: 96 % | HEART RATE: 72 BPM | WEIGHT: 215 LBS

## 2023-10-02 DIAGNOSIS — Z12.5 SCREENING FOR PROSTATE CANCER: ICD-10-CM

## 2023-10-02 DIAGNOSIS — Z71.3 DIETARY COUNSELING: ICD-10-CM

## 2023-10-02 DIAGNOSIS — E66.01 SEVERE OBESITY (HCC): ICD-10-CM

## 2023-10-02 DIAGNOSIS — Z71.82 EXERCISE COUNSELING: ICD-10-CM

## 2023-10-02 DIAGNOSIS — R42 VERTIGO: Primary | ICD-10-CM

## 2023-10-02 DIAGNOSIS — H93.13 TINNITUS OF BOTH EARS: ICD-10-CM

## 2023-10-02 DIAGNOSIS — E78.5 HYPERLIPIDEMIA, UNSPECIFIED HYPERLIPIDEMIA TYPE: ICD-10-CM

## 2023-10-02 PROCEDURE — 99214 OFFICE O/P EST MOD 30 MIN: CPT | Performed by: FAMILY MEDICINE

## 2023-10-02 RX ORDER — MECLIZINE HYDROCHLORIDE 25 MG/1
25 TABLET ORAL 3 TIMES DAILY PRN
Qty: 30 TABLET | Refills: 0 | Status: SHIPPED | OUTPATIENT
Start: 2023-10-02 | End: 2023-10-12

## 2023-10-06 PROBLEM — R42 VERTIGO: Status: ACTIVE | Noted: 2023-10-06

## 2023-10-06 PROBLEM — H93.13 TINNITUS OF BOTH EARS: Status: ACTIVE | Noted: 2023-10-06

## 2023-10-06 NOTE — PROGRESS NOTES
[address] [address]  Office : [phone]  Fax : [phone]      Subjective: The patient is a 45 y.o. male  who presents for f/u on few days hx of dizziness-spinning sensation  Pt also has slight diffuse headache-no URI symptoms  No head injury or fall  No ear pain-hearing good--mild tinnitis both years  No facial rash or any neurological symptoms  No cardiopulmonary symptoms-no fever/chills  Able to walk well  Hyperlipidemia-diet controlled-uses fish oil at times-refused statin  Obesity-pts diet poor    Patient Active Problem List   Diagnosis Code    Severe obesity ([facility]) E66.01    Hyperlipidemia E78.5    Need for hepatitis C screening test Z11.59    Encounter for immunization Z23    Screening for colon cancer Z12.11    Screening for prostate cancer Z12.5    Screening for metabolic disorder C77.890    Physical exam Z00.00       Past Medical History:   Diagnosis Date    Obesity (BMI 30-39. 9)     BMI 34.2    Psychiatric disorder     depression- no meds       Past Surgical History:   Procedure Laterality Date    HX ORTHOPAEDIC Right 2013    Knee       Social History     Socioeconomic History    Marital status: SINGLE     Spouse name: Not on file    Number of children: Not on file    Years of education: Not on file    Highest education level: Not on file   Occupational History    Not on file   Tobacco Use    Smoking status: Never Smoker    Smokeless tobacco: Never Used   Substance and Sexual Activity    Alcohol use: Yes     Comment: ocassional alcohol use    Drug use: No    Sexual activity: Not on file   Other Topics Concern    Not on file   Social History Narrative    Not on file     Social Determinants of Health     Financial Resource Strain: Low Risk     Difficulty of Paying Living Expenses: Not very hard   Food Insecurity: Unknown    Worried About Running Out of Food in the Last Year: Never true    Ran Out of Food in the Last Year: Not on file

## 2023-10-16 ENCOUNTER — OFFICE VISIT (OUTPATIENT)
Dept: PRIMARY CARE CLINIC | Facility: CLINIC | Age: 53
End: 2023-10-16
Payer: COMMERCIAL

## 2023-10-16 ENCOUNTER — TELEPHONE (OUTPATIENT)
Dept: PRIMARY CARE CLINIC | Facility: CLINIC | Age: 53
End: 2023-10-16

## 2023-10-16 VITALS
HEART RATE: 74 BPM | DIASTOLIC BLOOD PRESSURE: 80 MMHG | BODY MASS INDEX: 35.32 KG/M2 | HEIGHT: 65 IN | TEMPERATURE: 97.3 F | OXYGEN SATURATION: 98 % | SYSTOLIC BLOOD PRESSURE: 128 MMHG | WEIGHT: 212 LBS

## 2023-10-16 DIAGNOSIS — H61.23 EXCESSIVE EAR WAX, BILATERAL: ICD-10-CM

## 2023-10-16 DIAGNOSIS — Z71.3 DIETARY COUNSELING: ICD-10-CM

## 2023-10-16 DIAGNOSIS — R26.81 UNSTEADY GAIT WHEN WALKING: ICD-10-CM

## 2023-10-16 DIAGNOSIS — Z23 ENCOUNTER FOR IMMUNIZATION: ICD-10-CM

## 2023-10-16 DIAGNOSIS — Z71.82 EXERCISE COUNSELING: ICD-10-CM

## 2023-10-16 DIAGNOSIS — E66.01 SEVERE OBESITY (HCC): ICD-10-CM

## 2023-10-16 DIAGNOSIS — M79.672 CHRONIC HEEL PAIN, LEFT: ICD-10-CM

## 2023-10-16 DIAGNOSIS — R42 VERTIGO: Primary | ICD-10-CM

## 2023-10-16 DIAGNOSIS — H93.13 TINNITUS OF BOTH EARS: ICD-10-CM

## 2023-10-16 DIAGNOSIS — G89.29 CHRONIC HEEL PAIN, LEFT: ICD-10-CM

## 2023-10-16 PROCEDURE — 90471 IMMUNIZATION ADMIN: CPT | Performed by: FAMILY MEDICINE

## 2023-10-16 PROCEDURE — 69210 REMOVE IMPACTED EAR WAX UNI: CPT | Performed by: FAMILY MEDICINE

## 2023-10-16 PROCEDURE — 90674 CCIIV4 VAC NO PRSV 0.5 ML IM: CPT | Performed by: FAMILY MEDICINE

## 2023-10-16 PROCEDURE — 99214 OFFICE O/P EST MOD 30 MIN: CPT | Performed by: FAMILY MEDICINE

## 2023-10-16 RX ORDER — PREDNISONE 20 MG/1
20 TABLET ORAL DAILY
Qty: 7 TABLET | Refills: 0 | Status: SHIPPED | OUTPATIENT
Start: 2023-10-16 | End: 2023-10-23

## 2023-10-16 NOTE — PROGRESS NOTES
daily for 7 days, Disp-7 tablet, R-0Normal  -     XR FOOT LEFT (MIN 3 VIEWS);  Future       Orders Placed This Encounter   Procedures    MRI BRAIN WO CONTRAST     Standing Status:   Future     Standing Expiration Date:   10/16/2024    XR FOOT LEFT (MIN 3 VIEWS)     Standing Status:   Future     Standing Expiration Date:   10/16/2024    Influenza, FLUCELVAX, (age 10 mo+), IM, PF, 0.5 mL    [address] [facility], [place]     Referral Priority:   Routine     Referral Type:   Eval and Treat     Referral Reason:   Specialty Services Required     Requested Specialty:   Otolaryngology     Number of Visits Requested:   1    [id number] - DE REMOVE IMPACTED EAR WAX      Orders Placed This Encounter   Medications    predniSONE (DELTASONE) 20 MG tablet     Sig: Take 1 tablet by mouth daily for 7 days     Dispense:  7 tablet     Refill:  0     Lab Results   Component Value Date    WBC 9.8 08/25/2021    HGB 15.8 08/25/2021    HCT 47.3 08/25/2021    MCV 95.7 08/25/2021     08/25/2021     Lab Results   Component Value Date     (H) 08/07/2023    K 4.1 08/07/2023     (H) 08/07/2023    CO2 25 08/07/2023    BUN 15 08/07/2023    CREATININE 0.80 08/07/2023    GLUCOSE 96 08/07/2023    CALCIUM 9.6 08/07/2023    PROT 7.1 08/07/2023    LABALBU 3.7 08/07/2023    BILITOT 0.4 08/07/2023    ALKPHOS 69 08/07/2023    AST 13 (L) 08/07/2023    ALT 35 08/07/2023    LABGLOM >60 08/07/2023    GFRAA >60 09/08/2022    AGRATIO 0.9 (L) 08/25/2021    GLOB 3.4 08/07/2023       Lab Results   Component Value Date    CHOL 235 (H) 08/07/2023    TRIG 125 08/07/2023    HDL 53 08/07/2023    LDLCALC 157 (H) 08/07/2023    LABVLDL 25 (H) 08/07/2023    VLDL 30 08/20/2021    CHOLHDLRATIO 4.4 08/07/2023     No results found for: \"TSH\", \"TSHREFLEX\", \"TSHFT4\", \"TSHELE\", \"LPO9DFN\", \"TSHHS\"  Lab Results   Component Value Date    CHOL 235 (H) 08/07/2023    TRIG 125 08/07/2023    HDL 53 08/07/2023    LDLCALC 157 (H) 08/07/2023    LABVLDL 25 (H) 08/07/2023    VLDL

## 2023-10-24 ENCOUNTER — HOSPITAL ENCOUNTER (OUTPATIENT)
Dept: MRI IMAGING | Age: 53
Discharge: HOME | End: 2023-10-27
Attending: FAMILY MEDICINE
Payer: COMMERCIAL

## 2023-10-24 DIAGNOSIS — H93.13 TINNITUS OF BOTH EARS: ICD-10-CM

## 2023-10-24 DIAGNOSIS — R42 VERTIGO: ICD-10-CM

## 2023-10-24 DIAGNOSIS — R26.81 UNSTEADY GAIT WHEN WALKING: ICD-10-CM

## 2023-10-24 PROCEDURE — 70551 MRI BRAIN STEM W/O DYE: CPT

## 2023-10-26 ENCOUNTER — OFFICE VISIT (OUTPATIENT)
Dept: PRIMARY CARE CLINIC | Facility: CLINIC | Age: 53
End: 2023-10-26
Payer: COMMERCIAL

## 2023-10-26 VITALS
DIASTOLIC BLOOD PRESSURE: 60 MMHG | HEIGHT: 65 IN | WEIGHT: 215 LBS | HEART RATE: 75 BPM | SYSTOLIC BLOOD PRESSURE: 120 MMHG | TEMPERATURE: 97.8 F | BODY MASS INDEX: 35.82 KG/M2 | OXYGEN SATURATION: 96 %

## 2023-10-26 DIAGNOSIS — R26.81 UNSTEADY GAIT WHEN WALKING: ICD-10-CM

## 2023-10-26 DIAGNOSIS — I73.9 MICROANGIOPATHY (HCC): Primary | ICD-10-CM

## 2023-10-26 DIAGNOSIS — R90.89 ABNORMAL BRAIN MRI: ICD-10-CM

## 2023-10-26 DIAGNOSIS — G89.29 CHRONIC HEEL PAIN, LEFT: ICD-10-CM

## 2023-10-26 DIAGNOSIS — Z71.82 EXERCISE COUNSELING: ICD-10-CM

## 2023-10-26 DIAGNOSIS — M79.672 CHRONIC HEEL PAIN, LEFT: ICD-10-CM

## 2023-10-26 DIAGNOSIS — E78.5 HYPERLIPIDEMIA, UNSPECIFIED HYPERLIPIDEMIA TYPE: ICD-10-CM

## 2023-10-26 DIAGNOSIS — Z71.3 DIETARY COUNSELING: ICD-10-CM

## 2023-10-26 DIAGNOSIS — E66.01 SEVERE OBESITY (HCC): ICD-10-CM

## 2023-10-26 DIAGNOSIS — H93.13 TINNITUS OF BOTH EARS: ICD-10-CM

## 2023-10-26 DIAGNOSIS — R42 VERTIGO: ICD-10-CM

## 2023-10-26 PROCEDURE — 99214 OFFICE O/P EST MOD 30 MIN: CPT | Performed by: FAMILY MEDICINE

## 2023-10-27 RX ORDER — ASPIRIN 81 MG/1
81 TABLET ORAL DAILY
Qty: 90 TABLET | Refills: 1 | Status: SHIPPED | OUTPATIENT
Start: 2023-10-27

## 2023-10-27 RX ORDER — ATORVASTATIN CALCIUM 20 MG/1
20 TABLET, FILM COATED ORAL DAILY
Qty: 90 TABLET | Refills: 0 | Status: SHIPPED | OUTPATIENT
Start: 2023-10-27

## 2023-11-05 PROBLEM — I73.9 MICROANGIOPATHY (HCC): Status: ACTIVE | Noted: 2023-11-05

## 2023-11-05 PROBLEM — R26.81 UNSTEADY GAIT WHEN WALKING: Status: ACTIVE | Noted: 2023-11-05

## 2023-11-05 PROBLEM — Z12.5 SCREENING FOR PROSTATE CANCER: Status: RESOLVED | Noted: 2022-08-31 | Resolved: 2023-11-05

## 2023-11-05 PROBLEM — R90.89 ABNORMAL BRAIN MRI: Status: ACTIVE | Noted: 2023-11-05

## 2023-11-27 ENCOUNTER — OFFICE VISIT (OUTPATIENT)
Dept: ORTHOPEDIC SURGERY | Age: 53
End: 2023-11-27
Payer: COMMERCIAL

## 2023-11-27 DIAGNOSIS — M79.672 CHRONIC HEEL PAIN, LEFT: Primary | ICD-10-CM

## 2023-11-27 DIAGNOSIS — M72.2 PLANTAR FASCIITIS: ICD-10-CM

## 2023-11-27 DIAGNOSIS — G89.29 CHRONIC HEEL PAIN, LEFT: Primary | ICD-10-CM

## 2023-11-27 PROCEDURE — L4396 STATIC OR DYNAMI AFO PRE CST: HCPCS | Performed by: ORTHOPAEDIC SURGERY

## 2023-11-27 PROCEDURE — 20551 NJX 1 TENDON ORIGIN/INSJ: CPT | Performed by: ORTHOPAEDIC SURGERY

## 2023-11-27 PROCEDURE — L4350 ANKLE CONTROL ORTHO PRE OTS: HCPCS | Performed by: ORTHOPAEDIC SURGERY

## 2023-11-27 PROCEDURE — L2397 SUSPENSION SLEEVE LOWER EXT: HCPCS | Performed by: ORTHOPAEDIC SURGERY

## 2023-11-27 PROCEDURE — 99203 OFFICE O/P NEW LOW 30 MIN: CPT | Performed by: ORTHOPAEDIC SURGERY

## 2023-11-27 RX ORDER — METHYLPREDNISOLONE ACETATE 40 MG/ML
40 INJECTION, SUSPENSION INTRA-ARTICULAR; INTRALESIONAL; INTRAMUSCULAR; SOFT TISSUE ONCE
Status: COMPLETED | OUTPATIENT
Start: 2023-11-27 | End: 2023-11-27

## 2023-11-27 RX ADMIN — METHYLPREDNISOLONE ACETATE 40 MG: 40 INJECTION, SUSPENSION INTRA-ARTICULAR; INTRALESIONAL; INTRAMUSCULAR; SOFT TISSUE at 09:11

## 2023-11-27 NOTE — PROGRESS NOTES
Name: Yenny Villarreal  YOB: 1970  Gender: male  MRN: [id number]    CC: Left heel pain    HPI:   11/27/2023: Initial visit: Left heel pain: Several month duration: Reports at times he can work 17 to 18 hours a day. No trauma    ROS/Meds/PSH/PMH/FH/SH: reviewed today    Tobacco:  reports that he has never smoked. He has never used smokeless tobacco.     Physical Examination:  Patient appears to be alert and oriented with acceptable appearance. No obvious distress or SOB  CV: appears to have acceptable vascular color and capillary refill  Neuro: appears to have mostly intact light touch sensation   Skin: No soft tissue swelling  MS: Standing: Slight cavus: Gait protected first steps  Left = plantar medial heel/plantar fascia pain; first branch lateral plantar nerve/Baxters nerve entrapment  Left = no Achilles pain; no motion or motor loss    XR: Left: Standing AP lateral mortise ankle plus AP oblique foot taken today with os intermetatarseum; os peroneal; prior changes distal tibia but no acute fracture; minimal plantar and posterior heel enthesopathy  XR Impression:  As above      Reviewed Test/Records/Documents:   10/26/2023: Dr. Vlad Cavanaugh reflects off-and-on left heel pain with no injury with prolonged standing walking 12 hours a day: Also reflects brain MRI scan referred to neurology    Injection: We discussed risks/complication of injection decided proceed: After sterile prep: Left plantar medial heel injected 1 cc Xylocaine, 40 mg Depo-Medrol; he did well; he understands protecting his blood sugar for the next several days. 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 We explored treatment options.      The majority of his pain is after working 17 to 18 hour days and first thing in the morning  Hopefully with treatment today he will see

## 2023-12-27 ENCOUNTER — TELEPHONE (OUTPATIENT)
Dept: ORTHOPEDIC SURGERY | Age: 53
End: 2023-12-27

## 2023-12-27 NOTE — TELEPHONE ENCOUNTER
LM for pt to call the office back. Wanted to ask how pt was feeling and ask if they would like to reschedule their missed apt.

## 2024-01-25 ENCOUNTER — OFFICE VISIT (OUTPATIENT)
Dept: PRIMARY CARE CLINIC | Facility: CLINIC | Age: 54
End: 2024-01-25
Payer: COMMERCIAL

## 2024-01-25 VITALS
HEART RATE: 59 BPM | WEIGHT: 216 LBS | HEIGHT: 65 IN | BODY MASS INDEX: 35.99 KG/M2 | TEMPERATURE: 97.7 F | OXYGEN SATURATION: 95 % | DIASTOLIC BLOOD PRESSURE: 69 MMHG | SYSTOLIC BLOOD PRESSURE: 103 MMHG

## 2024-01-25 DIAGNOSIS — Z71.3 DIETARY COUNSELING: ICD-10-CM

## 2024-01-25 DIAGNOSIS — R42 VERTIGO: ICD-10-CM

## 2024-01-25 DIAGNOSIS — H93.13 TINNITUS OF BOTH EARS: ICD-10-CM

## 2024-01-25 DIAGNOSIS — Z71.82 EXERCISE COUNSELING: ICD-10-CM

## 2024-01-25 DIAGNOSIS — E78.5 HYPERLIPIDEMIA, UNSPECIFIED HYPERLIPIDEMIA TYPE: Primary | ICD-10-CM

## 2024-01-25 DIAGNOSIS — M79.672 CHRONIC HEEL PAIN, LEFT: ICD-10-CM

## 2024-01-25 DIAGNOSIS — I73.9 MICROANGIOPATHY (HCC): ICD-10-CM

## 2024-01-25 DIAGNOSIS — E66.01 SEVERE OBESITY (HCC): ICD-10-CM

## 2024-01-25 DIAGNOSIS — G89.29 CHRONIC HEEL PAIN, LEFT: ICD-10-CM

## 2024-01-25 PROCEDURE — 99214 OFFICE O/P EST MOD 30 MIN: CPT | Performed by: FAMILY MEDICINE

## 2024-01-25 RX ORDER — ASPIRIN 81 MG/1
81 TABLET ORAL DAILY
Qty: 90 TABLET | Refills: 1 | Status: SHIPPED | OUTPATIENT
Start: 2024-01-25

## 2024-01-25 RX ORDER — ATORVASTATIN CALCIUM 20 MG/1
20 TABLET, FILM COATED ORAL DAILY
Qty: 90 TABLET | Refills: 1 | Status: SHIPPED | OUTPATIENT
Start: 2024-01-25

## 2024-01-25 ASSESSMENT — PATIENT HEALTH QUESTIONNAIRE - PHQ9
SUM OF ALL RESPONSES TO PHQ QUESTIONS 1-9: 0
SUM OF ALL RESPONSES TO PHQ QUESTIONS 1-9: 0
2. FEELING DOWN, DEPRESSED OR HOPELESS: 0
SUM OF ALL RESPONSES TO PHQ QUESTIONS 1-9: 0
SUM OF ALL RESPONSES TO PHQ9 QUESTIONS 1 & 2: 0
SUM OF ALL RESPONSES TO PHQ QUESTIONS 1-9: 0
1. LITTLE INTEREST OR PLEASURE IN DOING THINGS: 0

## 2024-01-25 NOTE — PROGRESS NOTES
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    CPT code(s) 71032          We discussed the expected course, resolution and complications of the diagnosis(es) in detail.  Medication risks, benefits, costs, interactions, and alternatives were discussed as indicated.  I advised her to contact the office if her condition worsens, changes or fails to improve as anticipated. She expressed understanding with the diagnosis(es) and plan.     F/u with neurologist    Follow-up and Dispositions    Return in about 6 months (around 7/25/2024).          Kimber Romo MD

## 2024-07-25 ENCOUNTER — OFFICE VISIT (OUTPATIENT)
Dept: PRIMARY CARE CLINIC | Facility: CLINIC | Age: 54
End: 2024-07-25
Payer: COMMERCIAL

## 2024-07-25 VITALS
BODY MASS INDEX: 35.65 KG/M2 | DIASTOLIC BLOOD PRESSURE: 70 MMHG | WEIGHT: 214 LBS | HEIGHT: 65 IN | OXYGEN SATURATION: 94 % | SYSTOLIC BLOOD PRESSURE: 120 MMHG | TEMPERATURE: 98.2 F | HEART RATE: 59 BPM

## 2024-07-25 DIAGNOSIS — Z71.82 EXERCISE COUNSELING: ICD-10-CM

## 2024-07-25 DIAGNOSIS — I73.9 MICROANGIOPATHY (HCC): ICD-10-CM

## 2024-07-25 DIAGNOSIS — E78.5 HYPERLIPIDEMIA, UNSPECIFIED HYPERLIPIDEMIA TYPE: Primary | ICD-10-CM

## 2024-07-25 DIAGNOSIS — R42 VERTIGO: ICD-10-CM

## 2024-07-25 DIAGNOSIS — R73.03 PREDIABETES: ICD-10-CM

## 2024-07-25 DIAGNOSIS — E66.01 SEVERE OBESITY (HCC): ICD-10-CM

## 2024-07-25 DIAGNOSIS — Z71.3 DIETARY COUNSELING: ICD-10-CM

## 2024-07-25 PROCEDURE — 99214 OFFICE O/P EST MOD 30 MIN: CPT | Performed by: FAMILY MEDICINE

## 2024-07-25 RX ORDER — ATORVASTATIN CALCIUM 20 MG/1
20 TABLET, FILM COATED ORAL DAILY
Qty: 90 TABLET | Refills: 1 | Status: SHIPPED | OUTPATIENT
Start: 2024-07-25

## 2024-07-25 RX ORDER — ASPIRIN 81 MG/1
81 TABLET ORAL DAILY
Qty: 90 TABLET | Refills: 1 | Status: SHIPPED | OUTPATIENT
Start: 2024-07-25

## 2024-07-25 NOTE — PROGRESS NOTES
the expected course, resolution and complications of the diagnosis(es) in detail.  Medication risks, benefits, costs, interactions, and alternatives were discussed as indicated.  I advised her to contact the office if her condition worsens, changes or fails to improve as anticipated. She expressed understanding with the diagnosis(es) and plan.     F/u with neurologist    Follow-up and Dispositions    Return in about 4 months (around 12/3/2024), or physical/colonoscopy referral.          Kimber Romo MD

## 2024-11-04 NOTE — Clinical Note
[address] EMERGENCY DEPT  [address] [facility] [address] [phone]    Work/School Note    Date: 12/19/2021    To Whom It May concern:      Moisés Rodriguez was seen and treated today in the emergency room by the following provider(s):  Attending Provider: Vandana Nolasco MD  Physician Assistant: Juvenal Dancer, [place]. Moisés Rodriguez is excused from work/school on 12/19/21. He is clear to return to work/school on 12/20/21.         Sincerely,          AKBAR Kwan Render In Strict Bullet Format?: No Initiate Treatment: Clindamycin lotion QD Detail Level: Detailed Modify Regimen: Advised a foaming face wash

## 2025-01-24 ENCOUNTER — OFFICE VISIT (OUTPATIENT)
Dept: PRIMARY CARE CLINIC | Facility: CLINIC | Age: 55
End: 2025-01-24
Payer: COMMERCIAL

## 2025-01-24 VITALS
RESPIRATION RATE: 17 BRPM | SYSTOLIC BLOOD PRESSURE: 124 MMHG | HEIGHT: 65 IN | DIASTOLIC BLOOD PRESSURE: 75 MMHG | BODY MASS INDEX: 36.65 KG/M2 | TEMPERATURE: 98.1 F | OXYGEN SATURATION: 95 % | WEIGHT: 220 LBS | HEART RATE: 63 BPM

## 2025-01-24 DIAGNOSIS — R73.03 PREDIABETES: ICD-10-CM

## 2025-01-24 DIAGNOSIS — K63.5 POLYP OF COLON, UNSPECIFIED PART OF COLON, UNSPECIFIED TYPE: ICD-10-CM

## 2025-01-24 DIAGNOSIS — E66.01 SEVERE OBESITY: ICD-10-CM

## 2025-01-24 DIAGNOSIS — Z12.5 SCREENING FOR PROSTATE CANCER: ICD-10-CM

## 2025-01-24 DIAGNOSIS — Z71.3 DIETARY COUNSELING: ICD-10-CM

## 2025-01-24 DIAGNOSIS — E78.5 HYPERLIPIDEMIA, UNSPECIFIED HYPERLIPIDEMIA TYPE: ICD-10-CM

## 2025-01-24 DIAGNOSIS — Z00.00 PHYSICAL EXAM: Primary | ICD-10-CM

## 2025-01-24 DIAGNOSIS — Z71.82 EXERCISE COUNSELING: ICD-10-CM

## 2025-01-24 PROCEDURE — 99396 PREV VISIT EST AGE 40-64: CPT | Performed by: FAMILY MEDICINE

## 2025-01-24 RX ORDER — ATORVASTATIN CALCIUM 20 MG/1
20 TABLET, FILM COATED ORAL DAILY
Qty: 90 TABLET | Refills: 1 | Status: SHIPPED | OUTPATIENT
Start: 2025-01-24

## 2025-01-24 SDOH — ECONOMIC STABILITY: FOOD INSECURITY: WITHIN THE PAST 12 MONTHS, THE FOOD YOU BOUGHT JUST DIDN'T LAST AND YOU DIDN'T HAVE MONEY TO GET MORE.: NEVER TRUE

## 2025-01-24 SDOH — ECONOMIC STABILITY: FOOD INSECURITY: WITHIN THE PAST 12 MONTHS, YOU WORRIED THAT YOUR FOOD WOULD RUN OUT BEFORE YOU GOT MONEY TO BUY MORE.: NEVER TRUE

## 2025-01-24 ASSESSMENT — PATIENT HEALTH QUESTIONNAIRE - PHQ9
SUM OF ALL RESPONSES TO PHQ QUESTIONS 1-9: 0
1. LITTLE INTEREST OR PLEASURE IN DOING THINGS: NOT AT ALL
2. FEELING DOWN, DEPRESSED OR HOPELESS: NOT AT ALL
SUM OF ALL RESPONSES TO PHQ QUESTIONS 1-9: 0
SUM OF ALL RESPONSES TO PHQ9 QUESTIONS 1 & 2: 0
SUM OF ALL RESPONSES TO PHQ QUESTIONS 1-9: 0
SUM OF ALL RESPONSES TO PHQ QUESTIONS 1-9: 0

## 2025-01-25 PROBLEM — K63.5 POLYP OF COLON: Status: ACTIVE | Noted: 2025-01-25

## 2025-01-25 NOTE — PROGRESS NOTES
[facility] [address]  [address] [facility] [address]  [place] [address]  Office : [phone]  Fax : [phone]      Subjective:  The patient is a 51 y.o. male  who presents for f/u on PHYSICAL EXAM -no new concerns-pt here to get routeine labs and need refill on meds.no cardiopulmonary symptoms  Hyperlipidemia-diet controlled-uses fish oil at times-refused statin  Prediabetes-diet controlled  Obesity-pts diet poor    Patient Active Problem List   Diagnosis Code    Severe obesity (HCC) E66.01    Hyperlipidemia E78.5    Need for hepatitis C screening test Z11.59    Encounter for immunization Z23    Screening for colon cancer Z12.11    Screening for prostate cancer Z12.5    Screening for metabolic disorder Z13.228    Physical exam Z00.00       Past Medical History:   Diagnosis Date    Obesity (BMI 30-39.9)     BMI 34.2    Psychiatric disorder     depression- no meds       Past Surgical History:   Procedure Laterality Date    HX ORTHOPAEDIC Right 2013    Knee       Social History     Socioeconomic History    Marital status: SINGLE     Spouse name: Not on file    Number of children: Not on file    Years of education: Not on file    Highest education level: Not on file   Occupational History    Not on file   Tobacco Use    Smoking status: Never Smoker    Smokeless tobacco: Never Used   Substance and Sexual Activity    Alcohol use: Yes     Comment: ocassional alcohol use    Drug use: No    Sexual activity: Not on file   Other Topics Concern    Not on file   Social History Narrative    Not on file     Social Determinants of Health     Financial Resource Strain: Low Risk     Difficulty of Paying Living Expenses: Not very hard   Food Insecurity: Unknown    Worried About Running Out of Food in the Last Year: Never true    Ran Out of Food in the Last Year: Not on file   Transportation Needs: No Transportation Needs    Lack of Transportation (Medical): No    Lack of Transportation (Non-Medical): No   Physical Activity:

## 2025-01-28 ENCOUNTER — PREP FOR PROCEDURE (OUTPATIENT)
Age: 55
End: 2025-01-28

## 2025-01-28 DIAGNOSIS — Z12.11 ENCOUNTER FOR SCREENING COLONOSCOPY: ICD-10-CM

## 2025-02-03 RX ORDER — SODIUM CHLORIDE 9 MG/ML
25 INJECTION, SOLUTION INTRAVENOUS PRN
OUTPATIENT
Start: 2025-02-03

## 2025-02-03 RX ORDER — SODIUM CHLORIDE 0.9 % (FLUSH) 0.9 %
5-40 SYRINGE (ML) INJECTION PRN
OUTPATIENT
Start: 2025-02-03

## 2025-02-03 RX ORDER — SODIUM CHLORIDE 0.9 % (FLUSH) 0.9 %
5-40 SYRINGE (ML) INJECTION EVERY 12 HOURS SCHEDULED
OUTPATIENT
Start: 2025-02-03

## 2025-02-06 DIAGNOSIS — Z00.00 PHYSICAL EXAM: ICD-10-CM

## 2025-02-06 DIAGNOSIS — Z71.82 EXERCISE COUNSELING: ICD-10-CM

## 2025-02-06 DIAGNOSIS — Z71.3 DIETARY COUNSELING: ICD-10-CM

## 2025-02-06 DIAGNOSIS — E66.01 SEVERE OBESITY: ICD-10-CM

## 2025-02-06 DIAGNOSIS — K63.5 POLYP OF COLON, UNSPECIFIED PART OF COLON, UNSPECIFIED TYPE: ICD-10-CM

## 2025-02-06 DIAGNOSIS — R73.03 PREDIABETES: ICD-10-CM

## 2025-02-06 DIAGNOSIS — E78.5 HYPERLIPIDEMIA, UNSPECIFIED HYPERLIPIDEMIA TYPE: ICD-10-CM

## 2025-02-06 DIAGNOSIS — Z12.5 SCREENING FOR PROSTATE CANCER: ICD-10-CM

## 2025-02-06 LAB
ALBUMIN SERPL-MCNC: 3.7 G/DL (ref 3.5–5)
ALBUMIN/GLOB SERPL: 1.2 (ref 1–1.9)
ALP SERPL-CCNC: 65 U/L (ref 40–129)
ALT SERPL-CCNC: 32 U/L (ref 8–55)
ANION GAP SERPL CALC-SCNC: 11 MMOL/L (ref 7–16)
AST SERPL-CCNC: 22 U/L (ref 15–37)
BILIRUB SERPL-MCNC: 0.3 MG/DL (ref 0–1.2)
BUN SERPL-MCNC: 13 MG/DL (ref 6–23)
CALCIUM SERPL-MCNC: 9.2 MG/DL (ref 8.8–10.2)
CHLORIDE SERPL-SCNC: 108 MMOL/L (ref 98–107)
CHOLEST SERPL-MCNC: 210 MG/DL (ref 0–200)
CO2 SERPL-SCNC: 24 MMOL/L (ref 20–29)
CREAT SERPL-MCNC: 0.71 MG/DL (ref 0.8–1.3)
EST. AVERAGE GLUCOSE BLD GHB EST-MCNC: 125 MG/DL
GLOBULIN SER CALC-MCNC: 3.1 G/DL (ref 2.3–3.5)
GLUCOSE SERPL-MCNC: 94 MG/DL (ref 70–99)
HBA1C MFR BLD: 6 % (ref 0–5.6)
HDLC SERPL-MCNC: 43 MG/DL (ref 40–60)
HDLC SERPL: 4.9 (ref 0–5)
LDLC SERPL CALC-MCNC: 149 MG/DL (ref 0–100)
POTASSIUM SERPL-SCNC: 4.2 MMOL/L (ref 3.5–5.1)
PROT SERPL-MCNC: 6.8 G/DL (ref 6.3–8.2)
PSA SERPL-MCNC: 0.3 NG/ML (ref 0–4)
SODIUM SERPL-SCNC: 142 MMOL/L (ref 136–145)
TRIGL SERPL-MCNC: 89 MG/DL (ref 0–150)
VLDLC SERPL CALC-MCNC: 18 MG/DL (ref 6–23)

## 2025-02-23 PROBLEM — Z00.00 PHYSICAL EXAM: Status: RESOLVED | Noted: 2022-08-31 | Resolved: 2025-02-23

## 2025-02-27 PROBLEM — Z12.11 ENCOUNTER FOR SCREENING COLONOSCOPY: Status: RESOLVED | Noted: 2025-01-28 | Resolved: 2025-02-27

## 2025-03-17 ENCOUNTER — APPOINTMENT (OUTPATIENT)
Dept: CT IMAGING | Age: 55
End: 2025-03-17
Payer: COMMERCIAL

## 2025-03-17 ENCOUNTER — APPOINTMENT (OUTPATIENT)
Dept: ULTRASOUND IMAGING | Age: 55
End: 2025-03-17
Payer: COMMERCIAL

## 2025-03-17 ENCOUNTER — HOSPITAL ENCOUNTER (OUTPATIENT)
Age: 55
Setting detail: OBSERVATION
Discharge: HOME | End: 2025-03-18
Attending: SURGERY | Admitting: SURGERY
Payer: COMMERCIAL

## 2025-03-17 DIAGNOSIS — K81.0 ACUTE CHOLECYSTITIS: Primary | ICD-10-CM

## 2025-03-17 DIAGNOSIS — K80.00 ACUTE CALCULOUS CHOLECYSTITIS: ICD-10-CM

## 2025-03-17 LAB
ALBUMIN SERPL-MCNC: 3.8 G/DL (ref 3.5–5)
ALBUMIN/GLOB SERPL: 1 (ref 1–1.9)
ALP SERPL-CCNC: 69 U/L (ref 40–129)
ALT SERPL-CCNC: 37 U/L (ref 8–55)
ANION GAP SERPL CALC-SCNC: 13 MMOL/L (ref 7–16)
AST SERPL-CCNC: 27 U/L (ref 15–37)
BASOPHILS # BLD: 0.03 K/UL (ref 0–0.2)
BASOPHILS NFR BLD: 0.2 % (ref 0–2)
BILIRUB SERPL-MCNC: 0.5 MG/DL (ref 0–1.2)
BUN SERPL-MCNC: 20 MG/DL (ref 6–23)
CALCIUM SERPL-MCNC: 9.3 MG/DL (ref 8.8–10.2)
CHLORIDE SERPL-SCNC: 106 MMOL/L (ref 98–107)
CO2 SERPL-SCNC: 23 MMOL/L (ref 20–29)
CREAT SERPL-MCNC: 0.92 MG/DL (ref 0.8–1.3)
DIFFERENTIAL METHOD BLD: ABNORMAL
EOSINOPHIL # BLD: 0.11 K/UL (ref 0–0.8)
EOSINOPHIL NFR BLD: 0.7 % (ref 0.5–7.8)
ERYTHROCYTE [DISTWIDTH] IN BLOOD BY AUTOMATED COUNT: 12.6 % (ref 11.9–14.6)
GLOBULIN SER CALC-MCNC: 3.8 G/DL (ref 2.3–3.5)
GLUCOSE SERPL-MCNC: 118 MG/DL (ref 70–99)
HCT VFR BLD AUTO: 49.8 % (ref 41.1–50.3)
HGB BLD-MCNC: 16.8 G/DL (ref 13.6–17.2)
IMM GRANULOCYTES # BLD AUTO: 0.06 K/UL (ref 0–0.5)
IMM GRANULOCYTES NFR BLD AUTO: 0.4 % (ref 0–5)
INR PPP: 1
LIPASE SERPL-CCNC: 41 U/L (ref 13–60)
LYMPHOCYTES # BLD: 0.64 K/UL (ref 0.5–4.6)
LYMPHOCYTES NFR BLD: 4.4 % (ref 13–44)
MCH RBC QN AUTO: 32 PG (ref 26.1–32.9)
MCHC RBC AUTO-ENTMCNC: 33.7 G/DL (ref 31.4–35)
MCV RBC AUTO: 94.9 FL (ref 82–102)
MONOCYTES # BLD: 0.91 K/UL (ref 0.1–1.3)
MONOCYTES NFR BLD: 6.2 % (ref 4–12)
NEUTS SEG # BLD: 12.93 K/UL (ref 1.7–8.2)
NEUTS SEG NFR BLD: 88.1 % (ref 43–78)
NRBC # BLD: 0 K/UL (ref 0–0.2)
PLATELET # BLD AUTO: 261 K/UL (ref 150–450)
PMV BLD AUTO: 9.5 FL (ref 9.4–12.3)
POTASSIUM SERPL-SCNC: 4.5 MMOL/L (ref 3.5–5.1)
PROT SERPL-MCNC: 7.5 G/DL (ref 6.3–8.2)
PROTHROMBIN TIME: 13.6 SEC (ref 11.3–14.9)
RBC # BLD AUTO: 5.25 M/UL (ref 4.23–5.6)
SODIUM SERPL-SCNC: 142 MMOL/L (ref 136–145)
TROPONIN T SERPL HS-MCNC: <6 NG/L (ref 0–22)
TROPONIN T SERPL HS-MCNC: <6 NG/L (ref 0–22)
WBC # BLD AUTO: 14.7 K/UL (ref 4.3–11.1)

## 2025-03-17 PROCEDURE — 85610 PROTHROMBIN TIME: CPT

## 2025-03-17 PROCEDURE — 84484 ASSAY OF TROPONIN QUANT: CPT

## 2025-03-17 PROCEDURE — 80053 COMPREHEN METABOLIC PANEL: CPT

## 2025-03-17 PROCEDURE — 2580000003 HC RX 258: Performed by: PHYSICIAN ASSISTANT

## 2025-03-17 PROCEDURE — 2500000003 HC RX 250 WO HCPCS: Performed by: NURSE PRACTITIONER

## 2025-03-17 PROCEDURE — G0378 HOSPITAL OBSERVATION PER HR: HCPCS

## 2025-03-17 PROCEDURE — 74178 CT ABD&PLV WO CNTR FLWD CNTR: CPT

## 2025-03-17 PROCEDURE — 93005 ELECTROCARDIOGRAM TRACING: CPT | Performed by: PHYSICIAN ASSISTANT

## 2025-03-17 PROCEDURE — 99285 EMERGENCY DEPT VISIT HI MDM: CPT

## 2025-03-17 PROCEDURE — 96375 TX/PRO/DX INJ NEW DRUG ADDON: CPT

## 2025-03-17 PROCEDURE — 6360000004 HC RX CONTRAST MEDICATION: Performed by: PHYSICIAN ASSISTANT

## 2025-03-17 PROCEDURE — 96365 THER/PROPH/DIAG IV INF INIT: CPT

## 2025-03-17 PROCEDURE — 85025 COMPLETE CBC W/AUTO DIFF WBC: CPT

## 2025-03-17 PROCEDURE — 83690 ASSAY OF LIPASE: CPT

## 2025-03-17 PROCEDURE — 96361 HYDRATE IV INFUSION ADD-ON: CPT

## 2025-03-17 PROCEDURE — 2580000003 HC RX 258: Performed by: NURSE PRACTITIONER

## 2025-03-17 PROCEDURE — 76705 ECHO EXAM OF ABDOMEN: CPT

## 2025-03-17 PROCEDURE — 6360000002 HC RX W HCPCS: Performed by: PHYSICIAN ASSISTANT

## 2025-03-17 RX ORDER — ACETAMINOPHEN 650 MG/1
650 SUPPOSITORY RECTAL EVERY 6 HOURS PRN
Status: DISCONTINUED | OUTPATIENT
Start: 2025-03-17 | End: 2025-03-18

## 2025-03-17 RX ORDER — MAGNESIUM SULFATE IN WATER 40 MG/ML
2000 INJECTION, SOLUTION INTRAVENOUS PRN
Status: DISCONTINUED | OUTPATIENT
Start: 2025-03-17 | End: 2025-03-18 | Stop reason: HOSPADM

## 2025-03-17 RX ORDER — IOPAMIDOL 755 MG/ML
100 INJECTION, SOLUTION INTRAVASCULAR
Status: COMPLETED | OUTPATIENT
Start: 2025-03-17 | End: 2025-03-17

## 2025-03-17 RX ORDER — ONDANSETRON 4 MG/1
4 TABLET, ORALLY DISINTEGRATING ORAL EVERY 8 HOURS PRN
Status: DISCONTINUED | OUTPATIENT
Start: 2025-03-17 | End: 2025-03-18 | Stop reason: HOSPADM

## 2025-03-17 RX ORDER — SODIUM CHLORIDE 9 MG/ML
INJECTION, SOLUTION INTRAVENOUS PRN
Status: DISCONTINUED | OUTPATIENT
Start: 2025-03-17 | End: 2025-03-18 | Stop reason: HOSPADM

## 2025-03-17 RX ORDER — ONDANSETRON 2 MG/ML
4 INJECTION INTRAMUSCULAR; INTRAVENOUS ONCE
Status: COMPLETED | OUTPATIENT
Start: 2025-03-17 | End: 2025-03-17

## 2025-03-17 RX ORDER — SODIUM CHLORIDE 0.9 % (FLUSH) 0.9 %
5-40 SYRINGE (ML) INJECTION PRN
Status: DISCONTINUED | OUTPATIENT
Start: 2025-03-17 | End: 2025-03-18 | Stop reason: HOSPADM

## 2025-03-17 RX ORDER — MORPHINE SULFATE 4 MG/ML
4 INJECTION, SOLUTION INTRAMUSCULAR; INTRAVENOUS EVERY 4 HOURS PRN
Status: DISCONTINUED | OUTPATIENT
Start: 2025-03-17 | End: 2025-03-18 | Stop reason: HOSPADM

## 2025-03-17 RX ORDER — ACETAMINOPHEN 325 MG/1
650 TABLET ORAL EVERY 6 HOURS PRN
Status: DISCONTINUED | OUTPATIENT
Start: 2025-03-17 | End: 2025-03-18

## 2025-03-17 RX ORDER — SODIUM CHLORIDE 9 MG/ML
INJECTION, SOLUTION INTRAVENOUS CONTINUOUS
Status: DISCONTINUED | OUTPATIENT
Start: 2025-03-17 | End: 2025-03-18 | Stop reason: HOSPADM

## 2025-03-17 RX ORDER — ONDANSETRON 2 MG/ML
4 INJECTION INTRAMUSCULAR; INTRAVENOUS EVERY 6 HOURS PRN
Status: DISCONTINUED | OUTPATIENT
Start: 2025-03-17 | End: 2025-03-18 | Stop reason: HOSPADM

## 2025-03-17 RX ORDER — POTASSIUM CHLORIDE 7.45 MG/ML
10 INJECTION INTRAVENOUS PRN
Status: DISCONTINUED | OUTPATIENT
Start: 2025-03-17 | End: 2025-03-18 | Stop reason: HOSPADM

## 2025-03-17 RX ORDER — SODIUM CHLORIDE 0.9 % (FLUSH) 0.9 %
5-40 SYRINGE (ML) INJECTION EVERY 12 HOURS SCHEDULED
Status: DISCONTINUED | OUTPATIENT
Start: 2025-03-17 | End: 2025-03-18 | Stop reason: HOSPADM

## 2025-03-17 RX ORDER — POTASSIUM CHLORIDE 1500 MG/1
40 TABLET, EXTENDED RELEASE ORAL PRN
Status: DISCONTINUED | OUTPATIENT
Start: 2025-03-17 | End: 2025-03-18 | Stop reason: HOSPADM

## 2025-03-17 RX ORDER — MORPHINE SULFATE 2 MG/ML
2 INJECTION, SOLUTION INTRAMUSCULAR; INTRAVENOUS EVERY 4 HOURS PRN
Status: DISCONTINUED | OUTPATIENT
Start: 2025-03-17 | End: 2025-03-18 | Stop reason: HOSPADM

## 2025-03-17 RX ADMIN — PIPERACILLIN AND TAZOBACTAM 3375 MG: 3; .375 INJECTION, POWDER, LYOPHILIZED, FOR SOLUTION INTRAVENOUS at 21:46

## 2025-03-17 RX ADMIN — ONDANSETRON 4 MG: 2 INJECTION, SOLUTION INTRAMUSCULAR; INTRAVENOUS at 17:02

## 2025-03-17 RX ADMIN — FAMOTIDINE 20 MG: 10 INJECTION, SOLUTION INTRAVENOUS at 21:43

## 2025-03-17 RX ADMIN — SODIUM CHLORIDE, PRESERVATIVE FREE 10 ML: 5 INJECTION INTRAVENOUS at 22:25

## 2025-03-17 RX ADMIN — IOPAMIDOL 100 ML: 755 INJECTION, SOLUTION INTRAVENOUS at 17:08

## 2025-03-17 RX ADMIN — SODIUM CHLORIDE: 9 INJECTION, SOLUTION INTRAVENOUS at 21:41

## 2025-03-17 ASSESSMENT — LIFESTYLE VARIABLES
HOW MANY STANDARD DRINKS CONTAINING ALCOHOL DO YOU HAVE ON A TYPICAL DAY: PATIENT DOES NOT DRINK
HOW OFTEN DO YOU HAVE A DRINK CONTAINING ALCOHOL: NEVER

## 2025-03-17 ASSESSMENT — PAIN DESCRIPTION - PAIN TYPE: TYPE: ACUTE PAIN

## 2025-03-17 ASSESSMENT — PAIN - FUNCTIONAL ASSESSMENT
PAIN_FUNCTIONAL_ASSESSMENT: ACTIVITIES ARE NOT PREVENTED
PAIN_FUNCTIONAL_ASSESSMENT: 0-10

## 2025-03-17 ASSESSMENT — PAIN DESCRIPTION - LOCATION: LOCATION: ABDOMEN

## 2025-03-17 ASSESSMENT — PAIN DESCRIPTION - ORIENTATION: ORIENTATION: MID

## 2025-03-17 ASSESSMENT — PAIN SCALES - GENERAL
PAINLEVEL_OUTOF10: 9
PAINLEVEL_OUTOF10: 4

## 2025-03-17 ASSESSMENT — PAIN DESCRIPTION - DESCRIPTORS: DESCRIPTORS: DISCOMFORT

## 2025-03-17 NOTE — ED PROVIDER NOTES
Emergency Department Provider Note       PCP: Kimber Romo MD   Age: 54 y.o.   Sex: male     DISPOSITION Admitted 03/17/2025 09:26:06 PM                ICD-10-CM    1. Acute cholecystitis  K81.0           Medical Decision Making     54-year-old male presents to the ER with complaint of epigastric pain.  Patient states the abdominal pain started yesterday after he ate at a Mexican restaurant.  States this morning he had 1 episode of vomiting.  States there was a small amount of blood in the vomit.  Patient states he feels nauseous but has had no additional vomiting.  Patient states the pain has worsened throughout the day.  Patient also reports brown diarrhea.  Denies any black or bloody stools.  Denies fever, upper respiratory symptoms, chest pain, shortness of breath.  Daughter states she went to see him and he was pale.    Patient has a history of hyperlipidemia and is prediabetic.    White blood cell count is little elevated at 14.7.  Glucose is 118.  Labs are otherwise unremarkable.  CT scan demonstrates no evidence of active GI bleed.  There is mild focal thickening of the tip of the gallbladder which could represent a phrygian cap.  Ultrasound of the gallbladder ordered.    Discussed results with patient and daughter.  Discussed the incidental finding of the phrygian cap and ultrasound has been ordered.  They verbalized understanding and agreement with plan.    Ultrasound of the gallbladder demonstrates several gallstones, largest measuring approximately 13 mm with a positive sonographic Coronado sign.  This is diagnostic of acute calculus cholecystitis.    Discussed case with Dr. Schulz, ER attending.  Patient has acute cholecystitis.  He agrees with plan to admit.    Discussed case with general surgery NP, Kimberly Frommel through [facility].  She states she will get admit orders and likely surgery tomorrow.    Discussed case with patient.  Explained the diagnosis, admission, surgery tomorrow.   be performed on outpatient basis.      Scattered colonic diverticulosis without evidence of acute diverticulitis.      Electronically signed by Gentry Gant                   No results for input(s): \"COVID19\" in the last 72 hours.     Voice dictation software was used during the making of this note.  This software is not perfect and grammatical and other typographical errors may be present.  This note has not been completely proofread for errors.     Gayathri France PA-C  03/18/25 0119

## 2025-03-17 NOTE — ED TRIAGE NOTES
PT ambulatory to triage with c/o generalized abdominal pain with nausea/vomiting and diarrhea.     PT reports generalized fatigue and dizziness.     Symptoms started approx 0300 this AM

## 2025-03-18 ENCOUNTER — ANESTHESIA (OUTPATIENT)
Dept: SURGERY | Age: 55
End: 2025-03-18
Payer: COMMERCIAL

## 2025-03-18 ENCOUNTER — ANESTHESIA EVENT (OUTPATIENT)
Dept: SURGERY | Age: 55
End: 2025-03-18
Payer: COMMERCIAL

## 2025-03-18 VITALS
WEIGHT: 210 LBS | RESPIRATION RATE: 19 BRPM | SYSTOLIC BLOOD PRESSURE: 122 MMHG | DIASTOLIC BLOOD PRESSURE: 75 MMHG | TEMPERATURE: 97.8 F | BODY MASS INDEX: 34.99 KG/M2 | HEIGHT: 65 IN | HEART RATE: 62 BPM | OXYGEN SATURATION: 94 %

## 2025-03-18 LAB
EKG ATRIAL RATE: 70 BPM
EKG DIAGNOSIS: NORMAL
EKG P AXIS: 63 DEGREES
EKG P-R INTERVAL: 144 MS
EKG Q-T INTERVAL: 382 MS
EKG QRS DURATION: 86 MS
EKG QTC CALCULATION (BAZETT): 412 MS
EKG R AXIS: 85 DEGREES
EKG T AXIS: 44 DEGREES
EKG VENTRICULAR RATE: 70 BPM

## 2025-03-18 PROCEDURE — G0378 HOSPITAL OBSERVATION PER HR: HCPCS

## 2025-03-18 PROCEDURE — 6370000000 HC RX 637 (ALT 250 FOR IP): Performed by: SURGERY

## 2025-03-18 PROCEDURE — 2500000003 HC RX 250 WO HCPCS: Performed by: NURSE ANESTHETIST, CERTIFIED REGISTERED

## 2025-03-18 PROCEDURE — 3600000014 HC SURGERY LEVEL 4 ADDTL 15MIN: Performed by: SURGERY

## 2025-03-18 PROCEDURE — 3600000004 HC SURGERY LEVEL 4 BASE: Performed by: SURGERY

## 2025-03-18 PROCEDURE — 6360000002 HC RX W HCPCS: Performed by: SURGERY

## 2025-03-18 PROCEDURE — 88304 TISSUE EXAM BY PATHOLOGIST: CPT

## 2025-03-18 PROCEDURE — 96366 THER/PROPH/DIAG IV INF ADDON: CPT | Performed by: ANESTHESIOLOGY

## 2025-03-18 PROCEDURE — 2500000003 HC RX 250 WO HCPCS: Performed by: NURSE PRACTITIONER

## 2025-03-18 PROCEDURE — 93010 ELECTROCARDIOGRAM REPORT: CPT | Performed by: INTERNAL MEDICINE

## 2025-03-18 PROCEDURE — 6360000002 HC RX W HCPCS: Performed by: NURSE ANESTHETIST, CERTIFIED REGISTERED

## 2025-03-18 PROCEDURE — 2720000010 HC SURG SUPPLY STERILE: Performed by: SURGERY

## 2025-03-18 PROCEDURE — 2709999900 HC NON-CHARGEABLE SUPPLY: Performed by: SURGERY

## 2025-03-18 PROCEDURE — 2580000003 HC RX 258: Performed by: ANESTHESIOLOGY

## 2025-03-18 PROCEDURE — 7100000001 HC PACU RECOVERY - ADDTL 15 MIN: Performed by: SURGERY

## 2025-03-18 PROCEDURE — 6360000002 HC RX W HCPCS: Performed by: ANESTHESIOLOGY

## 2025-03-18 PROCEDURE — 96361 HYDRATE IV INFUSION ADD-ON: CPT | Performed by: ANESTHESIOLOGY

## 2025-03-18 PROCEDURE — 7100000000 HC PACU RECOVERY - FIRST 15 MIN: Performed by: SURGERY

## 2025-03-18 PROCEDURE — 2580000003 HC RX 258: Performed by: NURSE PRACTITIONER

## 2025-03-18 PROCEDURE — 3700000001 HC ADD 15 MINUTES (ANESTHESIA): Performed by: SURGERY

## 2025-03-18 PROCEDURE — 47562 LAPAROSCOPIC CHOLECYSTECTOMY: CPT | Performed by: SURGERY

## 2025-03-18 PROCEDURE — 96376 TX/PRO/DX INJ SAME DRUG ADON: CPT | Performed by: ANESTHESIOLOGY

## 2025-03-18 PROCEDURE — 6370000000 HC RX 637 (ALT 250 FOR IP): Performed by: ANESTHESIOLOGY

## 2025-03-18 PROCEDURE — 6360000002 HC RX W HCPCS: Performed by: NURSE PRACTITIONER

## 2025-03-18 PROCEDURE — 3700000000 HC ANESTHESIA ATTENDED CARE: Performed by: SURGERY

## 2025-03-18 RX ORDER — KETAMINE HCL IN NACL, ISO-OSM 20 MG/2 ML
SYRINGE (ML) INJECTION
Status: DISCONTINUED | OUTPATIENT
Start: 2025-03-18 | End: 2025-03-18 | Stop reason: SDUPTHER

## 2025-03-18 RX ORDER — PROCHLORPERAZINE EDISYLATE 5 MG/ML
5 INJECTION INTRAMUSCULAR; INTRAVENOUS
Status: DISCONTINUED | OUTPATIENT
Start: 2025-03-18 | End: 2025-03-18

## 2025-03-18 RX ORDER — FENTANYL CITRATE 50 UG/ML
INJECTION, SOLUTION INTRAMUSCULAR; INTRAVENOUS
Status: DISCONTINUED | OUTPATIENT
Start: 2025-03-18 | End: 2025-03-18 | Stop reason: SDUPTHER

## 2025-03-18 RX ORDER — SODIUM CHLORIDE 9 MG/ML
INJECTION, SOLUTION INTRAVENOUS PRN
Status: DISCONTINUED | OUTPATIENT
Start: 2025-03-18 | End: 2025-03-18 | Stop reason: HOSPADM

## 2025-03-18 RX ORDER — DIPHENHYDRAMINE HYDROCHLORIDE 50 MG/ML
12.5 INJECTION, SOLUTION INTRAMUSCULAR; INTRAVENOUS
Status: DISCONTINUED | OUTPATIENT
Start: 2025-03-18 | End: 2025-03-18

## 2025-03-18 RX ORDER — NEOSTIGMINE METHYLSULFATE 1 MG/ML
INJECTION INTRAVENOUS
Status: DISCONTINUED | OUTPATIENT
Start: 2025-03-18 | End: 2025-03-18 | Stop reason: SDUPTHER

## 2025-03-18 RX ORDER — KETOROLAC TROMETHAMINE 30 MG/ML
INJECTION, SOLUTION INTRAMUSCULAR; INTRAVENOUS
Status: DISCONTINUED | OUTPATIENT
Start: 2025-03-18 | End: 2025-03-18 | Stop reason: SDUPTHER

## 2025-03-18 RX ORDER — SODIUM CHLORIDE 0.9 % (FLUSH) 0.9 %
5-40 SYRINGE (ML) INJECTION EVERY 12 HOURS SCHEDULED
Status: DISCONTINUED | OUTPATIENT
Start: 2025-03-18 | End: 2025-03-18 | Stop reason: HOSPADM

## 2025-03-18 RX ORDER — SODIUM CHLORIDE, SODIUM LACTATE, POTASSIUM CHLORIDE, CALCIUM CHLORIDE 600; 310; 30; 20 MG/100ML; MG/100ML; MG/100ML; MG/100ML
INJECTION, SOLUTION INTRAVENOUS CONTINUOUS
Status: DISCONTINUED | OUTPATIENT
Start: 2025-03-18 | End: 2025-03-18 | Stop reason: HOSPADM

## 2025-03-18 RX ORDER — ACETAMINOPHEN 500 MG
1000 TABLET ORAL ONCE
Status: COMPLETED | OUTPATIENT
Start: 2025-03-18 | End: 2025-03-18

## 2025-03-18 RX ORDER — NALOXONE HYDROCHLORIDE 0.4 MG/ML
INJECTION, SOLUTION INTRAMUSCULAR; INTRAVENOUS; SUBCUTANEOUS PRN
Status: DISCONTINUED | OUTPATIENT
Start: 2025-03-18 | End: 2025-03-18

## 2025-03-18 RX ORDER — KETOROLAC TROMETHAMINE 30 MG/ML
30 INJECTION, SOLUTION INTRAMUSCULAR; INTRAVENOUS EVERY 6 HOURS PRN
Status: DISCONTINUED | OUTPATIENT
Start: 2025-03-18 | End: 2025-03-18 | Stop reason: HOSPADM

## 2025-03-18 RX ORDER — ACETAMINOPHEN 650 MG/1
650 SUPPOSITORY RECTAL EVERY 6 HOURS PRN
Status: DISCONTINUED | OUTPATIENT
Start: 2025-03-18 | End: 2025-03-18 | Stop reason: HOSPADM

## 2025-03-18 RX ORDER — MIDAZOLAM HYDROCHLORIDE 1 MG/ML
INJECTION, SOLUTION INTRAMUSCULAR; INTRAVENOUS
Status: DISCONTINUED | OUTPATIENT
Start: 2025-03-18 | End: 2025-03-18 | Stop reason: SDUPTHER

## 2025-03-18 RX ORDER — DEXTROSE MONOHYDRATE 100 MG/ML
INJECTION, SOLUTION INTRAVENOUS CONTINUOUS PRN
Status: DISCONTINUED | OUTPATIENT
Start: 2025-03-18 | End: 2025-03-18

## 2025-03-18 RX ORDER — BUPIVACAINE HYDROCHLORIDE 5 MG/ML
INJECTION, SOLUTION EPIDURAL; INTRACAUDAL; PERINEURAL PRN
Status: DISCONTINUED | OUTPATIENT
Start: 2025-03-18 | End: 2025-03-18 | Stop reason: ALTCHOICE

## 2025-03-18 RX ORDER — OXYCODONE HYDROCHLORIDE 5 MG/1
5 TABLET ORAL
Status: DISCONTINUED | OUTPATIENT
Start: 2025-03-18 | End: 2025-03-18

## 2025-03-18 RX ORDER — SODIUM CHLORIDE 0.9 % (FLUSH) 0.9 %
5-40 SYRINGE (ML) INJECTION EVERY 12 HOURS SCHEDULED
Status: DISCONTINUED | OUTPATIENT
Start: 2025-03-18 | End: 2025-03-18

## 2025-03-18 RX ORDER — MIDAZOLAM HYDROCHLORIDE 2 MG/2ML
2 INJECTION, SOLUTION INTRAMUSCULAR; INTRAVENOUS
Status: DISCONTINUED | OUTPATIENT
Start: 2025-03-18 | End: 2025-03-18 | Stop reason: HOSPADM

## 2025-03-18 RX ORDER — SODIUM CHLORIDE 0.9 % (FLUSH) 0.9 %
5-40 SYRINGE (ML) INJECTION PRN
Status: DISCONTINUED | OUTPATIENT
Start: 2025-03-18 | End: 2025-03-18 | Stop reason: HOSPADM

## 2025-03-18 RX ORDER — LIDOCAINE HYDROCHLORIDE 20 MG/ML
INJECTION, SOLUTION EPIDURAL; INFILTRATION; INTRACAUDAL; PERINEURAL
Status: DISCONTINUED | OUTPATIENT
Start: 2025-03-18 | End: 2025-03-18 | Stop reason: SDUPTHER

## 2025-03-18 RX ORDER — GLYCOPYRROLATE 0.2 MG/ML
INJECTION INTRAMUSCULAR; INTRAVENOUS
Status: DISCONTINUED | OUTPATIENT
Start: 2025-03-18 | End: 2025-03-18 | Stop reason: SDUPTHER

## 2025-03-18 RX ORDER — ROCURONIUM BROMIDE 10 MG/ML
INJECTION, SOLUTION INTRAVENOUS
Status: DISCONTINUED | OUTPATIENT
Start: 2025-03-18 | End: 2025-03-18 | Stop reason: SDUPTHER

## 2025-03-18 RX ORDER — SODIUM CHLORIDE 0.9 % (FLUSH) 0.9 %
5-40 SYRINGE (ML) INJECTION PRN
Status: DISCONTINUED | OUTPATIENT
Start: 2025-03-18 | End: 2025-03-18

## 2025-03-18 RX ORDER — LIDOCAINE HYDROCHLORIDE 10 MG/ML
1 INJECTION, SOLUTION INFILTRATION; PERINEURAL
Status: DISCONTINUED | OUTPATIENT
Start: 2025-03-18 | End: 2025-03-18 | Stop reason: HOSPADM

## 2025-03-18 RX ORDER — SODIUM CHLORIDE, SODIUM LACTATE, POTASSIUM CHLORIDE, CALCIUM CHLORIDE 600; 310; 30; 20 MG/100ML; MG/100ML; MG/100ML; MG/100ML
INJECTION, SOLUTION INTRAVENOUS CONTINUOUS
Status: DISCONTINUED | OUTPATIENT
Start: 2025-03-18 | End: 2025-03-18

## 2025-03-18 RX ORDER — ACETAMINOPHEN 325 MG/1
650 TABLET ORAL EVERY 6 HOURS PRN
Status: DISCONTINUED | OUTPATIENT
Start: 2025-03-18 | End: 2025-03-18 | Stop reason: HOSPADM

## 2025-03-18 RX ORDER — SODIUM CHLORIDE 9 MG/ML
INJECTION, SOLUTION INTRAVENOUS PRN
Status: DISCONTINUED | OUTPATIENT
Start: 2025-03-18 | End: 2025-03-18

## 2025-03-18 RX ORDER — OXYCODONE HYDROCHLORIDE 5 MG/1
10 TABLET ORAL EVERY 4 HOURS PRN
Status: DISCONTINUED | OUTPATIENT
Start: 2025-03-18 | End: 2025-03-18 | Stop reason: HOSPADM

## 2025-03-18 RX ORDER — OXYCODONE AND ACETAMINOPHEN 5; 325 MG/1; MG/1
1 TABLET ORAL EVERY 6 HOURS PRN
Qty: 12 TABLET | Refills: 0 | Status: SHIPPED | OUTPATIENT
Start: 2025-03-18 | End: 2025-03-21

## 2025-03-18 RX ORDER — PROPOFOL 10 MG/ML
INJECTION, EMULSION INTRAVENOUS
Status: DISCONTINUED | OUTPATIENT
Start: 2025-03-18 | End: 2025-03-18 | Stop reason: SDUPTHER

## 2025-03-18 RX ORDER — IBUPROFEN 600 MG/1
1 TABLET ORAL PRN
Status: DISCONTINUED | OUTPATIENT
Start: 2025-03-18 | End: 2025-03-18

## 2025-03-18 RX ORDER — ONDANSETRON 2 MG/ML
4 INJECTION INTRAMUSCULAR; INTRAVENOUS EVERY 6 HOURS PRN
Status: DISCONTINUED | OUTPATIENT
Start: 2025-03-18 | End: 2025-03-18 | Stop reason: SDUPTHER

## 2025-03-18 RX ORDER — FENTANYL CITRATE 50 UG/ML
100 INJECTION, SOLUTION INTRAMUSCULAR; INTRAVENOUS
Status: DISCONTINUED | OUTPATIENT
Start: 2025-03-18 | End: 2025-03-18 | Stop reason: HOSPADM

## 2025-03-18 RX ADMIN — KETOROLAC TROMETHAMINE 30 MG: 30 INJECTION, SOLUTION INTRAMUSCULAR at 10:44

## 2025-03-18 RX ADMIN — FENTANYL CITRATE 100 MCG: 50 INJECTION, SOLUTION INTRAMUSCULAR; INTRAVENOUS at 11:05

## 2025-03-18 RX ADMIN — GLYCOPYRROLATE 0.5 MG: 0.2 INJECTION INTRAMUSCULAR; INTRAVENOUS at 10:51

## 2025-03-18 RX ADMIN — PIPERACILLIN AND TAZOBACTAM 3375 MG: 3; .375 INJECTION, POWDER, LYOPHILIZED, FOR SOLUTION INTRAVENOUS at 04:30

## 2025-03-18 RX ADMIN — FAMOTIDINE 20 MG: 10 INJECTION, SOLUTION INTRAVENOUS at 09:09

## 2025-03-18 RX ADMIN — Medication 20 MG: at 10:11

## 2025-03-18 RX ADMIN — Medication 2 G: at 10:05

## 2025-03-18 RX ADMIN — SODIUM CHLORIDE, SODIUM LACTATE, POTASSIUM CHLORIDE, AND CALCIUM CHLORIDE: 600; 310; 30; 20 INJECTION, SOLUTION INTRAVENOUS at 09:56

## 2025-03-18 RX ADMIN — PROPOFOL 50 MG: 10 INJECTION, EMULSION INTRAVENOUS at 10:12

## 2025-03-18 RX ADMIN — PIPERACILLIN AND TAZOBACTAM 3375 MG: 3; .375 INJECTION, POWDER, LYOPHILIZED, FOR SOLUTION INTRAVENOUS at 13:01

## 2025-03-18 RX ADMIN — FENTANYL CITRATE 100 MCG: 50 INJECTION, SOLUTION INTRAMUSCULAR; INTRAVENOUS at 10:11

## 2025-03-18 RX ADMIN — SODIUM CHLORIDE, PRESERVATIVE FREE 10 ML: 5 INJECTION INTRAVENOUS at 09:05

## 2025-03-18 RX ADMIN — NEOSTIGMINE METHYLSULFATE 5 MG: 1 INJECTION INTRAVENOUS at 10:51

## 2025-03-18 RX ADMIN — ACETAMINOPHEN 1000 MG: 500 TABLET, FILM COATED ORAL at 10:02

## 2025-03-18 RX ADMIN — OXYCODONE 10 MG: 5 TABLET ORAL at 19:07

## 2025-03-18 RX ADMIN — ROCURONIUM BROMIDE 35 MG: 10 INJECTION, SOLUTION INTRAVENOUS at 10:11

## 2025-03-18 RX ADMIN — HYDROMORPHONE HYDROCHLORIDE 0.5 MG: 1 INJECTION, SOLUTION INTRAMUSCULAR; INTRAVENOUS; SUBCUTANEOUS at 11:23

## 2025-03-18 RX ADMIN — HYDROMORPHONE HYDROCHLORIDE 0.5 MG: 1 INJECTION, SOLUTION INTRAMUSCULAR; INTRAVENOUS; SUBCUTANEOUS at 11:13

## 2025-03-18 RX ADMIN — PROPOFOL 200 MG: 10 INJECTION, EMULSION INTRAVENOUS at 10:11

## 2025-03-18 RX ADMIN — MIDAZOLAM 2 MG: 1 INJECTION INTRAMUSCULAR; INTRAVENOUS at 10:05

## 2025-03-18 RX ADMIN — LIDOCAINE HYDROCHLORIDE 100 MG: 20 INJECTION, SOLUTION EPIDURAL; INFILTRATION; INTRACAUDAL; PERINEURAL at 10:11

## 2025-03-18 ASSESSMENT — PAIN SCALES - GENERAL
PAINLEVEL_OUTOF10: 8
PAINLEVEL_OUTOF10: 8
PAINLEVEL_OUTOF10: 7

## 2025-03-18 ASSESSMENT — PAIN - FUNCTIONAL ASSESSMENT: PAIN_FUNCTIONAL_ASSESSMENT: 0-10

## 2025-03-18 NOTE — PROGRESS NOTES
TRANSFER - IN REPORT:    Verbal report received from Ale LYNN  on Jenaro Pino  being received from ED for routine progression of patient care      Report consisted of patient's Situation, Background, Assessment and   Recommendations(SBAR).     Information from the following report(s) Nurse Handoff Report, ED Encounter Summary, ED SBAR, Intake/Output, MAR, and Recent Results was reviewed with the receiving nurse.    Opportunity for questions and clarification was provided.      Assessment completed upon patient's arrival to unit and care assumed.

## 2025-03-18 NOTE — ANESTHESIA POSTPROCEDURE EVALUATION
Department of Anesthesiology  Postprocedure Note    Patient: Jenaro Pino  MRN: [id number]  YOB: 1970  Date of evaluation: 3/18/2025    Procedure Summary       Date: 03/18/25 Room / Location: [facility] MAIN OR 04 / [facility] MAIN OR    Anesthesia Start: 1003 Anesthesia Stop: 1107    Procedure: CHOLECYSTECTOMY LAPAROSCOPIC (Abdomen) Diagnosis:       Acute cholecystitis      (Acute cholecystitis [K81.0])    Surgeons: Zachariah Ashley MD Responsible Provider: Zachariah Williamson MD    Anesthesia Type: general ASA Status: 2            Anesthesia Type: No value filed.    Sy Phase I: Sy Score: 9    Sy Phase II:      Anesthesia Post Evaluation    Patient location during evaluation: PACU  Patient participation: complete - patient participated  Level of consciousness: awake and alert  Airway patency: patent  Nausea: well controlled.  Cardiovascular status: acceptable.  Respiratory status: acceptable  Hydration status: stable  Pain management: adequate    No notable events documented.

## 2025-03-18 NOTE — ANESTHESIA PRE PROCEDURE
Department of Anesthesiology  Preprocedure Note       Name:  Jenaro Pino   Age:  54 y.o.  :  1970                                          MRN:  [id number]         Date:  3/18/2025      Surgeon: Surgeon(s):  Zachariah Ashley MD    Procedure: Procedure(s):  CHOLECYSTECTOMY LAPAROSCOPIC    Medications prior to admission:   Prior to Admission medications    Medication Sig Start Date End Date Taking? Authorizing Provider   Omega-3 Fatty Acids (FISH OIL) 1000 MG capsule Take 1 capsule by mouth daily 23  Yes Kimber Romo MD   atorvastatin (LIPITOR) 20 MG tablet Take 1 tablet by mouth daily 25   Kimber Romo MD   aspirin 81 MG EC tablet Take 1 tablet by mouth daily  Patient not taking: Reported on 3/17/2025 7/25/24   Kimber Romo MD   Probiotic Product (UP4 PROBIOTICS PO) Take 1 tablet by mouth daily  Patient not taking: Reported on 3/17/2025    ProviderKatie MD       Current medications:    Current Facility-Administered Medications   Medication Dose Route Frequency Provider Last Rate Last Admin    ceFAZolin (ANCEF) 2000 mg in sterile water 20 mL IV syringe  2,000 mg IntraVENous On Call Zachariah Ashley MD        lidocaine 1 % injection 1 mL  1 mL IntraDERmal Once PRN Zachariah Williamson MD        acetaminophen (TYLENOL) tablet 1,000 mg  1,000 mg Oral Once Zachariah Williamson MD        fentaNYL (SUBLIMAZE) injection 100 mcg  100 mcg IntraVENous Once PRN Zachariah Williamson MD        lactated ringers infusion   IntraVENous Continuous Zachariah Williamson  mL/hr at 25 0956 New Bag at 25 0956    sodium chloride flush 0.9 % injection 5-40 mL  5-40 mL IntraVENous 2 times per day Zachariah Williamson MD        sodium chloride flush 0.9 % injection 5-40 mL  5-40 mL IntraVENous PRN Zachariah Williamson MD        0.9 % sodium chloride infusion   IntraVENous PRN Zachariah Williamson MD        midazolam PF (VERSED) injection 2 mg  2 mg IntraVENous Once PRN Zachariah Williamson MD        acetaminophen (TYLENOL) tablet

## 2025-03-18 NOTE — DISCHARGE INSTRUCTIONS
Laparoscopic Cholecystectomy Post Op  Discharge Care Sheet    Activity: Please take it easy for a couple of days after surgery. Walking is encouraged. The sooner you are up walking the faster your recovery. This will also help prevent constipation and pneumonia. You should avoid lifting, pushing and/or pulling anything greater than 10 pounds for 6 weeks following surgery (a gallon of milk weighs approximately 8.6 lbs.) You may resume work and full activity within 6-8 weeks. This will help your incision stay intact.    Driving: You may drive when you are no longer taking the narcotic pain medication, can quickly move your foot from gas pedal to brake and turn the steering wheel with both arms. You must also be able to sit comfortably for a long period of time, even if you do not drive far, you may get caught in traffic!    Diet: You may resume a regular diet. You may want to avoid trigger foods such as deep fried fatty foods and carbonated beverages for the first two weeks. The prescription for pain medication sometimes may cause nausea. If this happens, eat bland foods such as toast or crackers and sip ginger ale. If nausea is severe, call our office.    You may have a scopolamine patch placed behind your ear for nausea prevention. The patch is effective for 72 hours after placement. This medication may cause dizziness or blurred vision. The patch may be removed prior to 72 hours if nausea is not present. You may peel if off, being sure to wash hands thoroughly after removal.    Pain: You may experience some pain in the surgical area. A prescription was written for narcotics at the time of your surgery. You may want to use this medication during the first few days of your recovery when you're most likely to have pain. While taking this prescription, also take 650 mg (2 regular strength)Tylenol every 6 hours to help with pain management. As your pain lessens use less and less of the narcotic pain medicine and begin  to switch to Ibuprofen (Advil) and/or Acetaminophen (Tylenol) as the narcotic can cause constipation and nausea. Cepacol lozenges are available over the counter which can help soothe any throat irritation. A pillow to hug is helpful when coughing and sneezing.    Care of your incisions: You will have dissolvable stiches and/or Dermabond, which is a type of skin glue, on your abdomen. The sutures may stick up from the skin but they will fall off. You may shower 24 hours following surgery, but do not submerge your incisions in a bath, hot tub, or pool for 2 weeks. You may notice the incision feels like a hard ridge, this is to be expected. You may use a cold (ice) compress on the area of the incision for the first 24 hours to reduce swelling. Do not leave the compress in place for longer than 20-30 minutes at a time to protect the skin.     Bowel elimination: Constipation may occur after surgery due to both the anesthesia and the narcotic pain medication. Please try prune juice, Milk of Magnesia, a mild laxative such as Dulcolax, or a saline enema. If you experience diarrhea, avoid dairy products. If you have gas pain, use Mylicon or Gas-X.    Follow-up: Ideally we would like to see you in the office 14-21 days following your surgery. Please call the office sooner rather than later to ensure a convenient appointment time [phone].    Please notify us if any of the following occur:  Fever greater than 100.5 degrees F  New or worsening pain that is not alleviated with pain medication  Severe nausea and vomiting  Develop a rash around your surgical site  Any redness, swelling, and/or drainage from your incision.  Inability to urinate for 10 hours following your surgical procedure.    We are here for you during your recovery. If you have any questions please feel free to call our office: [phone].

## 2025-03-18 NOTE — ED NOTES
TRANSFER - OUT REPORT:    Verbal report given to LEAH Munoz on Jenaro Pino  being transferred to [facility]  for routine progression of patient care       Report consisted of patient's Situation, Background, Assessment and   Recommendations(SBAR).     Information from the following report(s) ED Encounter Summary, ED SBAR, MAR, and Recent Results was reviewed with the receiving nurse.    Lines:   Peripheral IV 03/17/25 Left Antecubital (Active)   Site Assessment Clean, dry & intact 03/17/25 1533   Line Status Blood return noted;Normal saline locked;Capped;Flushed;Specimen collected 03/17/25 1533   Phlebitis Assessment No symptoms 03/17/25 1533   Infiltration Assessment 0 03/17/25 1533   Dressing Status New dressing applied;Clean, dry & intact 03/17/25 1533   Dressing Type Transparent 03/17/25 1533   Dressing Intervention New 03/17/25 1533        Opportunity for questions and clarification was provided.      Patient transported with:  Registered Nurse

## 2025-03-18 NOTE — CARE COORDINATION
Case Management Assessment  Initial Evaluation    Date/Time of Evaluation: 3/18/2025 11:23 AM  Assessment Completed by: GRIS POMPA    If patient is discharged prior to next notation, then this note serves as note for discharge by case management.    SW TONO completed chart review for initial CM assessment. Pt in surgery for laparoscopic cholecystectomy when attempted to complete initial assessment. Pt admitted with acute cholecystitis and acute acalculous cholecystitis. [facility] PCP Dr. Kimber Romo, last visit 1/24/25. Pt independent with ADLs PTA. DC/POC to return home with family when medically stable.    Surgery primary attending. Anticipate routine discharge today pending medical stability.    CM team to continue to follow for any needs that may arise.     GRIS Pompa, [facility]    [facility]     03/18/25 1116   Service Assessment   Patient Orientation Other (see comment)  (in surgery)   Cognition Other (see comment)  (in surgery)   History Provided By Medical Record   Primary Caregiver Self   Accompanied By/Relationship N/A   Support Systems Children   Patient's Healthcare Decision Maker is: Patient Declined (Legal Next of Kin Remains as Decision Maker)   PCP Verified by CM Yes  (Dr. Kimber Romo)   Last Visit to PCP Within last 3 months  (1/24/25)   Prior Functional Level Independent in ADLs/IADLs   Current Functional Level Independent in ADLs/IADLs   Can patient return to prior living arrangement Yes   Ability to make needs known: Good   Family able to assist with home care needs: Yes   Would you like for me to discuss the discharge plan with any other family members/significant others, and if so, who? No   Financial Resources Other (Comment)  ([employer])   Community Resources None   CM/SW Referral Other (see comment)  (standard assessment)   Social/Functional History   Type of Home House   Discharge Planning   Type of Residence House   Patient

## 2025-03-18 NOTE — PROGRESS NOTES
Patient discharged per order. Discharge instructions reviewed with patient, questions answered. IV removed.

## 2025-03-18 NOTE — OP NOTE
Cholecystectomy Op Note Template Note     Indications: The patient was admitted to the hospital with acute cholecystitis/cholelithiasis.   The patient now presents for a laparoscopic, possible open, cholecystectomy after discussing therapeutic alternatives.     Pre-Op Diagnosis: Acute cholecystitis [K81.0]/cholelithiasis    Post-Op Diagnosis: Same    Procedure: LAPAROSCOPIC CHOLECYSTECTOMY      Surgeon: LEW SIMONS MD    Anesthesia:  General plus local         Procedure Details     The patient was brought to the operating room and placed supine. After induction of a general anesthetic, the abdomen was prepped and draped in standard fashion.  An incision was made in the umbilicus and a Gage trocar was placed in the usual fashion after which  the abdomen was insufflated to 15mmHg sterile CO2.  The other trocars were then placed under direct vision.  These were positioned four fingerbreadths below the right costal margin in the anterior axillary line and mid clavicular line and just to the right of the falciform ligament in the epigatrium.  The gallbladder was unable to be grasped as it had a thick wall and was markedly distended. I had to decompress it of 90 cc's of bile using a cyst puncture needle and a 60 cc syringe. Once decompressed it could be grasped and tented up toward the right virgen-diaphragm. Then omental adhesions were taken down. The area of Calot's triangle was dissected with the cystic duct and cystic artery being exposed. Once these two structures had been identified the gallbladder graspers were repositioned, so that one was on the liver/gallbladder junction and a second on the fundus of the gallbladder. The gallbladder was  from the liver with the use of the Harmonic scalpel. The area of Calot's triangle was dissected. The Critical View of Safety was obtained. Once the only two structures going into the gallbladder were identified, then the structures were clipped. The dissection was

## 2025-03-18 NOTE — PROGRESS NOTES
TRANSFER - IN REPORT:    Verbal report received from Laura LYNN on Jenaro Pino  being received from 3rd floor for ordered procedure      Report consisted of patient's Situation, Background, Assessment and   Recommendations(SBAR).     Information from the following report(s) Nurse Handoff Report was reviewed with the receiving nurse.    Opportunity for questions and clarification was provided.      Assessment completed upon patient's arrival to unit and care assumed.

## 2025-03-18 NOTE — PERIOP NOTE
TRANSFER - OUT REPORT:    Verbal report given to Laura LYNN on Jenaro Pino  being transferred to [facility] for routine progression of patient care       Report consisted of patient's Situation, Background, Assessment and   Recommendations(SBAR).     Information from the following report(s) Nurse Handoff Report was reviewed with the receiving nurse.    Lines:   Peripheral IV 03/17/25 Left Antecubital (Active)   Site Assessment Clean, dry & intact 03/18/25 1123   Line Status Infusing 03/18/25 1123   Line Care Connections checked and tightened 03/17/25 2221   Phlebitis Assessment No symptoms 03/18/25 1123   Infiltration Assessment 0 03/18/25 1123   Alcohol Cap Used Yes 03/17/25 2221   Dressing Status Clean, dry & intact 03/18/25 1123   Dressing Type Transparent 03/18/25 1123   Dressing Intervention New 03/17/25 1533        Opportunity for questions and clarification was provided.      Patient transported with:  O2 @ 4lpm

## 2025-03-18 NOTE — PROGRESS NOTES
..TRANSFER - IN REPORT:    Verbal report received from LEAH Hart on Jeanro Pino  being received from PACU for routine post-op      Report consisted of patient's Situation, Background, Assessment and   Recommendations(SBAR).     Information from the following report(s) Surgery Report was reviewed with the receiving nurse.    Opportunity for questions and clarification was provided.

## 2025-03-18 NOTE — H&P
H&P/Consult Note/Progress Note/Office Note:   Jenaro Pino  MRN: [id number]  :1970  Age:54 y.o.    HPI: Jenaro Pino is a 54 y.o. male who  has a past medical history of Obesity (BMI 30-39.9) and Psychiatric disorder.     ER Note: 54-year-old male presents to the ER with complaint of epigastric pain. Patient states the abdominal pain started yesterday after he ate at a Mexican restaurant. States this morning he had 1 episode of vomiting. States there was a small amount of blood in the vomit. Patient states he feels nauseous but has had no additional vomiting. Patient states the pain has worsened throughout the day. Patient also reports brown diarrhea. Denies any black or bloody stools. Denies fever, upper respiratory symptoms, chest pain, shortness of breath. Daughter states she went to see him and he was pale.     US was done in the ER:   Narrative & Impression  RIGHT UPPER QUADRANT  ULTRASOUND     INDICATION: Pain     COMPARISON: Reviewed     Transabdominal imaging of the upper abdomen was performed.     FINDINGS:   -LIVER echogenic heterogeneous without focal mass.  Several gallstones are seen with the largest measuring up to approximately 13 mm  with a positive sonographic Coronado sign as documented by the technologist. This  is diagnostic of acute calculus cholecystitis.  -PANCREAS: Normal.     -RIGHT KIDNEY unremarkable  -ABDOMINAL AORTA AND IVC: Normal in size.  -ASCITES: No free fluid.     IMPRESSION:  Several gallstones are seen with the largest measuring up to approximately 13 mm  with a positive sonographic Coronado sign as documented by the technologist. This  is diagnostic of acute calculus cholecystitis.     Hepatic steatosis.    Today, he reports that he is still sore. He denies any nausea or vomiting at this time. He is voiding normally. No fevers or chills. He is currently NPO. No cardiac history. Not on any blood thinners. No hx of abdominal surgeries. He is otherwise doing well.      Past  generated.    INDICATION: Hematemesis, epigastric abdominal pain    COMPARISON: None    FINDINGS:  Clear lung bases. No pleural effusion. Mild coronary calcifications. The liver  is negative. The pancreas and spleen are negative. There is mild focal  thickening at the tip of the gallbladder which could represent a phrygian cap.  The adrenals are negative. There is a simple appearing cyst at the upper pole  the right kidney measuring 2.9 cm. Symmetric renal enhancement without  hydronephrosis or nephrolithiasis. The urinary bladder and prostate are  negative. There are scattered colonic diverticuli without evidence of acute  diverticulitis. No bowel wall thickening or evidence of obstruction. Normal  appendix. No evidence of active GI bleed. No free peritoneal air or ascites. No  lymphadenopathy within the abdomen or pelvis. Normal caliber abdominal aorta  with patent mesenteric vasculature. There are chronic appearing bilateral pars  interarticularis defects at L5 with 8 mm anterolisthesis at L5-S1. Severe  degenerative disc disease is noted at this level.    Impression  No evidence of active GI bleed.    Mild focal thickening of the tip of the gallbladder could represent a phrygian  cap. Recommend correlation with right upper quadrant ultrasound. If the patient  is asymptomatic this could be performed on outpatient basis.    Scattered colonic diverticulosis without evidence of acute diverticulitis.    Electronically signed by Gentry Gant    US Result (most recent):  US ABDOMEN LIMITED 03/17/2025    Narrative  RIGHT UPPER QUADRANT  ULTRASOUND    INDICATION: Pain    COMPARISON: Reviewed    Transabdominal imaging of the upper abdomen was performed.    FINDINGS:  -LIVER echogenic heterogeneous without focal mass.  Several gallstones are seen with the largest measuring up to approximately 13 mm  with a positive sonographic Coronado sign as documented by the technologist. This  is diagnostic of acute calculus

## 2025-03-19 ENCOUNTER — CARE COORDINATION (OUTPATIENT)
Dept: CARE COORDINATION | Facility: CLINIC | Age: 55
End: 2025-03-19

## 2025-03-19 NOTE — CARE COORDINATION
Care Transitions Note    Initial Call - Call within 2 business days of discharge: Yes    Attempted to reach patient for transitions of care follow up. Unable to reach patient.    Outreach Attempts:   HIPAA compliant voicemail left for patient.     Patient: Jenaro Pino    Patient : 1970   MRN: [id number]    Reason for Admission: Acute cholecystitis  Discharge Date: 3/18/25  RURS: No data recorded  Last Discharge Facility       Date Complaint Diagnosis Description Type Department Provider    3/17/25 Abdominal Pain Acute cholecystitis ... ED to Hosp-Admission (Discharged) (ADMITTED) [facility]Zachariah Orosco MD            Was this an external facility discharge? No    Follow Up Appointment:   Patient does not have a follow up appointment scheduled at time of call.  Unable to reach patient.  Future Appointments         Provider Specialty Dept Phone    2025 8:20 AM Kimber Romo MD Primary Care [phone]            Plan for follow-up on next business day.      Niharika Chatman LPN

## 2025-03-20 ENCOUNTER — CARE COORDINATION (OUTPATIENT)
Dept: CARE COORDINATION | Facility: CLINIC | Age: 55
End: 2025-03-20

## 2025-03-20 NOTE — CARE COORDINATION
Care Transitions Note    Initial Call - Call within 2 business days of discharge: Yes    Attempted to reach patient and child  for initial  transitions of care follow up call. Unable to reach patient. Sent SOUTH letter via my chart .Will re-open chart if phone calk is returned.    Outreach Attempts:   HIPAA compliant voicemail left for patient.     Patient: Jenaro Pino    Patient : 1970   MRN: [id number]    Reason for Admission: Acute Cholecystitis  Discharge Date: 3/18/25  RURS: No data recorded  Last Discharge Facility       Date Complaint Diagnosis Description Type Department Provider    3/17/25 Abdominal Pain Acute cholecystitis ... ED to Hosp-Admission (Discharged) (ADMITTED) [facility] Zachariah Ashley MD            Was this an external facility discharge? No    Follow Up Appointment:   Patient does not have a follow up appointment scheduled at time of call.  Unable to reach x 2  Future Appointments         Provider Specialty Dept Phone    2025 1:10 PM Ac Romero PA General Surgery [phone]    2025 8:20 AM Kimber Romo MD Primary Care [phone]            No further follow-up call indicated     Niharika Chatman LPN

## 2025-03-27 ENCOUNTER — TELEPHONE (OUTPATIENT)
Age: 55
End: 2025-03-27

## 2025-03-27 NOTE — TELEPHONE ENCOUNTER
Called patient to reschedule surgery with Jose Juan    / Paul Chris RN Ellis, Wanda Keily  Family called stating patient needs to reschedule their procedure. Recently had surgery and looking for June date.

## 2025-04-01 ENCOUNTER — OFFICE VISIT (OUTPATIENT)
Dept: SURGERY | Age: 55
End: 2025-04-01

## 2025-04-01 DIAGNOSIS — K80.00 ACUTE CALCULOUS CHOLECYSTITIS: Primary | ICD-10-CM

## 2025-04-01 PROCEDURE — 99024 POSTOP FOLLOW-UP VISIT: CPT

## 2025-04-01 NOTE — PROGRESS NOTES
poDate: 2025      Name: Jenaro Pino      MRN: [id number]       : 1970       Age: 54 y.o.    Sex: male        Kimber Romo MD       CC:    Chief Complaint   Patient presents with    Post-Op Check     S/p 3/18/2025 CHOLECYSTECTOMY LAPAROSCOPIC (DGA)       HPI:  The patient presents for a post-op visit s/p lap kusum. The patient reports that they are doing well. Pt has some mild post op pain, however, they deny any new or worsening symptoms. Pt denies any difficulty with eating, voiding, or movement. Surgical incision is well appearing with no signs of infection.      Final Pathologic Diagnosis     A. Gallbladder, cholecystectomy:   Chronic cholecystitis.   Cholelithiasis.     Physical Exam:     There were no vitals taken for this visit.    General: Alert, oriented, cooperative in no acute distress.     Neck: Supple, trachea midline, no appreciable thyromegaly  Resp: Breathing is  non-labored. Lungs clear to auscultation without wheezing or rhonchi   CV: RRR. No murmurs, rubs or gallops appreciated.  Abd: soft non-tender and non-distended without peritoneal signs. +bs    Skin/incision: No evidence of infection.      Assessment/Plan:  Jenaro Pino is a 54 y.o. male who is s/p lap kusum.    - avoid heavy lifting for 4-6 weeks.  - Avoid submerging in water for 3 weeks post op.  - Call with any questions or concerns.  - Follow up as needed.      RYAN Kelly   2025  1:03 PM

## 2025-04-16 PROBLEM — Z12.11 ENCOUNTER FOR SCREENING COLONOSCOPY: Status: ACTIVE | Noted: 2025-01-28

## 2025-05-16 PROBLEM — Z12.11 ENCOUNTER FOR SCREENING COLONOSCOPY: Status: RESOLVED | Noted: 2025-01-28 | Resolved: 2025-05-16

## 2025-05-30 ENCOUNTER — OFFICE VISIT (OUTPATIENT)
Dept: PRIMARY CARE CLINIC | Facility: CLINIC | Age: 55
End: 2025-05-30
Payer: COMMERCIAL

## 2025-05-30 VITALS
RESPIRATION RATE: 17 BRPM | WEIGHT: 196.6 LBS | HEIGHT: 65 IN | BODY MASS INDEX: 32.76 KG/M2 | SYSTOLIC BLOOD PRESSURE: 115 MMHG | HEART RATE: 60 BPM | OXYGEN SATURATION: 96 % | DIASTOLIC BLOOD PRESSURE: 67 MMHG | TEMPERATURE: 97.2 F

## 2025-05-30 DIAGNOSIS — E66.01 SEVERE OBESITY (HCC): ICD-10-CM

## 2025-05-30 DIAGNOSIS — Z71.3 DIETARY COUNSELING: ICD-10-CM

## 2025-05-30 DIAGNOSIS — K63.5 POLYP OF COLON, UNSPECIFIED PART OF COLON, UNSPECIFIED TYPE: ICD-10-CM

## 2025-05-30 DIAGNOSIS — E55.9 VITAMIN D DEFICIENCY: ICD-10-CM

## 2025-05-30 DIAGNOSIS — E78.5 HYPERLIPIDEMIA, UNSPECIFIED HYPERLIPIDEMIA TYPE: Primary | ICD-10-CM

## 2025-05-30 DIAGNOSIS — R73.03 PREDIABETES: ICD-10-CM

## 2025-05-30 DIAGNOSIS — I73.9 MICROANGIOPATHY: ICD-10-CM

## 2025-05-30 PROCEDURE — 99214 OFFICE O/P EST MOD 30 MIN: CPT | Performed by: FAMILY MEDICINE

## 2025-05-30 RX ORDER — ASPIRIN 81 MG/1
81 TABLET ORAL DAILY
Qty: 90 TABLET | Refills: 1 | Status: SHIPPED | OUTPATIENT
Start: 2025-05-30

## 2025-05-30 RX ORDER — ATORVASTATIN CALCIUM 20 MG/1
20 TABLET, FILM COATED ORAL DAILY
Qty: 90 TABLET | Refills: 1 | Status: SHIPPED | OUTPATIENT
Start: 2025-05-30

## 2025-05-30 NOTE — PROGRESS NOTES
[facility]  [address]  [place] [address]  Office : [phone]  Fax : [phone]      Subjective:  The patient is a 51 y.o. male  who presents for f/u on chronic medical conditions  Prediabetes diet controlled  Hyperlipidemia-diet controlled-uses fish oil at times-refused statin-on and off use in 2024/25  Obesity-pts diet poor  Microangiopathy CNS-on aspirin and statin  ABNORMAL MRI -need neurology consult in 2023-pt not done f/u and reluctant in 2024  Recent cholecystectomy-doing better-lost weight-on and off constipation-trying to eat healthy    Patient Active Problem List   Diagnosis Code    Severe obesity (HCC) E66.01    Hyperlipidemia E78.5    Need for hepatitis C screening test Z11.59    Encounter for immunization Z23    Screening for colon cancer Z12.11    Screening for prostate cancer Z12.5    Screening for metabolic disorder Z13.228    Physical exam Z00.00       Past Medical History:   Diagnosis Date    Obesity (BMI 30-39.9)     BMI 34.2    Psychiatric disorder     depression- no meds       Past Surgical History:   Procedure Laterality Date    HX ORTHOPAEDIC Right 2013    Knee       Social History     Socioeconomic History    Marital status: SINGLE     Spouse name: Not on file    Number of children: Not on file    Years of education: Not on file    Highest education level: Not on file   Occupational History    Not on file   Tobacco Use    Smoking status: Never Smoker    Smokeless tobacco: Never Used   Substance and Sexual Activity    Alcohol use: Yes     Comment: ocassional alcohol use    Drug use: No    Sexual activity: Not on file   Other Topics Concern    Not on file   Social History Narrative    Not on file     Social Determinants of Health     Financial Resource Strain: Low Risk     Difficulty of Paying Living Expenses: Not very hard   Food Insecurity: Unknown    Worried About Running Out of Food in the Last Year: Never true    Ran Out of Food in the Last Year: Not

## 2025-06-10 PROBLEM — Z12.11 ENCOUNTER FOR SCREENING COLONOSCOPY: Status: ACTIVE | Noted: 2025-01-28

## 2025-06-13 ENCOUNTER — TELEPHONE (OUTPATIENT)
Age: 55
End: 2025-06-13

## 2025-06-13 NOTE — TELEPHONE ENCOUNTER
Called patient to reschedule   / no vm avail   (cancel surgery )     Carmina Mcgrath MA Ellis, Wanda Denise  Hi, pt lvm requesting to cancel procedure. No reason given.

## (undated) DEVICE — TROCAR: Brand: KII FIOS FIRST ENTRY

## (undated) DEVICE — GLOVE SURG SZ 75 CRM LTX FREE POLYISOPRENE POLYMER BEAD ANTI

## (undated) DEVICE — CANISTER, RIGID, 2000CC: Brand: MEDLINE INDUSTRIES, INC.

## (undated) DEVICE — TUBING INSUFFLATION SMK EVAC HI FLO SET PNEUMOCLEAR

## (undated) DEVICE — TROCAR LAPSCP SZ 12 MM 10 CM LEN BLADED N THRD N OPT VW BLNT

## (undated) DEVICE — LIQUIBAND RAPID ADHESIVE 36/CS 0.8ML: Brand: MEDLINE

## (undated) DEVICE — SUTURE VICRYL + SZ 0 L27IN ABSRB VLT L26MM UR-6 5/8 CIR VCP603H

## (undated) DEVICE — PUMP SUC IRR TBNG L10FT W/ HNDPC ASSEMB STRYKEFLOW 2

## (undated) DEVICE — INTENDED FOR TISSUE SEPARATION, AND OTHER PROCEDURES THAT REQUIRE A SHARP SURGICAL BLADE TO PUNCTURE OR CUT.: Brand: BARD-PARKER ® STAINLESS STEEL BLADES

## (undated) DEVICE — GENERAL LAPAROSCOPY: Brand: MEDLINE INDUSTRIES, INC.

## (undated) DEVICE — APPLIER CLP M/L SHFT DIA5MM 15 LIG LIGAMAX 5

## (undated) DEVICE — TROCAR: Brand: KII® SLEEVE

## (undated) DEVICE — SUTURE MONOCRYL SZ 4-0 L27IN ABSRB UD L19MM PS-2 1/2 CIR PRIM Y426H

## (undated) DEVICE — SOLUTION IRRIG 1000ML 0.9% SOD CHL USP POUR PLAS BTL